# Patient Record
Sex: FEMALE | Race: WHITE | NOT HISPANIC OR LATINO | Employment: FULL TIME | URBAN - METROPOLITAN AREA
[De-identification: names, ages, dates, MRNs, and addresses within clinical notes are randomized per-mention and may not be internally consistent; named-entity substitution may affect disease eponyms.]

---

## 2023-01-22 ENCOUNTER — HOSPITAL ENCOUNTER (EMERGENCY)
Facility: HOSPITAL | Age: 33
Discharge: HOME | End: 2023-01-22
Attending: EMERGENCY MEDICINE | Admitting: EMERGENCY MEDICINE
Payer: COMMERCIAL

## 2023-01-22 ENCOUNTER — APPOINTMENT (OUTPATIENT)
Dept: CT IMAGING | Facility: HOSPITAL | Age: 33
End: 2023-01-22
Payer: COMMERCIAL

## 2023-01-22 VITALS
OXYGEN SATURATION: 97 % | SYSTOLIC BLOOD PRESSURE: 124 MMHG | HEART RATE: 65 BPM | TEMPERATURE: 99 F | RESPIRATION RATE: 16 BRPM | DIASTOLIC BLOOD PRESSURE: 82 MMHG

## 2023-01-22 DIAGNOSIS — G43.909 MIGRAINE WITHOUT STATUS MIGRAINOSUS, NOT INTRACTABLE, UNSPECIFIED MIGRAINE TYPE: Primary | ICD-10-CM

## 2023-01-22 DIAGNOSIS — R07.89 ATYPICAL CHEST PAIN: ICD-10-CM

## 2023-01-22 LAB
ALBUMIN SERPL-MCNC: 4 G/DL (ref 3.5–5.2)
ALBUMIN/GLOB SERPL: 1.5 G/DL
ALP SERPL-CCNC: 44 U/L (ref 39–117)
ALT SERPL W P-5'-P-CCNC: 15 U/L (ref 1–33)
ANION GAP SERPL CALCULATED.3IONS-SCNC: 8.4 MMOL/L (ref 5–15)
AST SERPL-CCNC: 13 U/L (ref 1–32)
BASOPHILS # BLD AUTO: 0.05 10*3/MM3 (ref 0–0.2)
BASOPHILS NFR BLD AUTO: 0.8 % (ref 0–1.5)
BILIRUB SERPL-MCNC: 0.3 MG/DL (ref 0–1.2)
BUN SERPL-MCNC: 11 MG/DL (ref 6–20)
BUN/CREAT SERPL: 13.9 (ref 7–25)
CALCIUM SPEC-SCNC: 8.7 MG/DL (ref 8.6–10.5)
CHLORIDE SERPL-SCNC: 108 MMOL/L (ref 98–107)
CO2 SERPL-SCNC: 22.6 MMOL/L (ref 22–29)
CREAT SERPL-MCNC: 0.79 MG/DL (ref 0.57–1)
D DIMER PPP FEU-MCNC: <0.27 MCGFEU/ML (ref 0–0.5)
DEPRECATED RDW RBC AUTO: 38.4 FL (ref 37–54)
EGFRCR SERPLBLD CKD-EPI 2021: 103.3 ML/MIN/1.73
EOSINOPHIL # BLD AUTO: 0.14 10*3/MM3 (ref 0–0.4)
EOSINOPHIL NFR BLD AUTO: 2.2 % (ref 0.3–6.2)
ERYTHROCYTE [DISTWIDTH] IN BLOOD BY AUTOMATED COUNT: 12.3 % (ref 12.3–15.4)
GLOBULIN UR ELPH-MCNC: 2.6 GM/DL
GLUCOSE SERPL-MCNC: 102 MG/DL (ref 65–99)
HCT VFR BLD AUTO: 44 % (ref 34–46.6)
HGB BLD-MCNC: 14.8 G/DL (ref 12–15.9)
HOLD SPECIMEN: NORMAL
HOLD SPECIMEN: NORMAL
IMM GRANULOCYTES # BLD AUTO: 0.01 10*3/MM3 (ref 0–0.05)
IMM GRANULOCYTES NFR BLD AUTO: 0.2 % (ref 0–0.5)
LYMPHOCYTES # BLD AUTO: 1.55 10*3/MM3 (ref 0.7–3.1)
LYMPHOCYTES NFR BLD AUTO: 24.7 % (ref 19.6–45.3)
MCH RBC QN AUTO: 28.7 PG (ref 26.6–33)
MCHC RBC AUTO-ENTMCNC: 33.6 G/DL (ref 31.5–35.7)
MCV RBC AUTO: 85.3 FL (ref 79–97)
MONOCYTES # BLD AUTO: 0.34 10*3/MM3 (ref 0.1–0.9)
MONOCYTES NFR BLD AUTO: 5.4 % (ref 5–12)
NEUTROPHILS NFR BLD AUTO: 4.19 10*3/MM3 (ref 1.7–7)
NEUTROPHILS NFR BLD AUTO: 66.7 % (ref 42.7–76)
NRBC BLD AUTO-RTO: 0 /100 WBC (ref 0–0.2)
PLATELET # BLD AUTO: 273 10*3/MM3 (ref 140–450)
PMV BLD AUTO: 11 FL (ref 6–12)
POTASSIUM SERPL-SCNC: 4.2 MMOL/L (ref 3.5–5.2)
PROT SERPL-MCNC: 6.6 G/DL (ref 6–8.5)
QT INTERVAL: 367 MS
RBC # BLD AUTO: 5.16 10*6/MM3 (ref 3.77–5.28)
SODIUM SERPL-SCNC: 139 MMOL/L (ref 136–145)
TROPONIN T SERPL-MCNC: <0.01 NG/ML (ref 0–0.03)
WBC NRBC COR # BLD: 6.28 10*3/MM3 (ref 3.4–10.8)
WHOLE BLOOD HOLD COAG: NORMAL
WHOLE BLOOD HOLD SPECIMEN: NORMAL

## 2023-01-22 PROCEDURE — 70450 CT HEAD/BRAIN W/O DYE: CPT

## 2023-01-22 PROCEDURE — 96375 TX/PRO/DX INJ NEW DRUG ADDON: CPT

## 2023-01-22 PROCEDURE — 93005 ELECTROCARDIOGRAM TRACING: CPT

## 2023-01-22 PROCEDURE — 85025 COMPLETE CBC W/AUTO DIFF WBC: CPT

## 2023-01-22 PROCEDURE — 84484 ASSAY OF TROPONIN QUANT: CPT

## 2023-01-22 PROCEDURE — 99283 EMERGENCY DEPT VISIT LOW MDM: CPT

## 2023-01-22 PROCEDURE — 85379 FIBRIN DEGRADATION QUANT: CPT | Performed by: NURSE PRACTITIONER

## 2023-01-22 PROCEDURE — 93010 ELECTROCARDIOGRAM REPORT: CPT | Performed by: INTERNAL MEDICINE

## 2023-01-22 PROCEDURE — 36415 COLL VENOUS BLD VENIPUNCTURE: CPT

## 2023-01-22 PROCEDURE — 25010000002 METHYLPREDNISOLONE PER 125 MG: Performed by: NURSE PRACTITIONER

## 2023-01-22 PROCEDURE — 25010000002 PROCHLORPERAZINE 10 MG/2ML SOLUTION: Performed by: NURSE PRACTITIONER

## 2023-01-22 PROCEDURE — 93005 ELECTROCARDIOGRAM TRACING: CPT | Performed by: EMERGENCY MEDICINE

## 2023-01-22 PROCEDURE — 80053 COMPREHEN METABOLIC PANEL: CPT

## 2023-01-22 PROCEDURE — 25010000002 DIPHENHYDRAMINE PER 50 MG: Performed by: NURSE PRACTITIONER

## 2023-01-22 PROCEDURE — 96374 THER/PROPH/DIAG INJ IV PUSH: CPT

## 2023-01-22 RX ORDER — PROCHLORPERAZINE EDISYLATE 5 MG/ML
10 INJECTION INTRAMUSCULAR; INTRAVENOUS ONCE
Status: COMPLETED | OUTPATIENT
Start: 2023-01-22 | End: 2023-01-22

## 2023-01-22 RX ORDER — OMEPRAZOLE 20 MG/1
20 CAPSULE, DELAYED RELEASE ORAL DAILY
COMMUNITY

## 2023-01-22 RX ORDER — DIPHENHYDRAMINE HYDROCHLORIDE 50 MG/ML
25 INJECTION INTRAMUSCULAR; INTRAVENOUS ONCE
Status: COMPLETED | OUTPATIENT
Start: 2023-01-22 | End: 2023-01-22

## 2023-01-22 RX ORDER — CETIRIZINE HYDROCHLORIDE 5 MG/1
5 TABLET ORAL DAILY
COMMUNITY

## 2023-01-22 RX ORDER — METHYLPREDNISOLONE SODIUM SUCCINATE 125 MG/2ML
125 INJECTION, POWDER, LYOPHILIZED, FOR SOLUTION INTRAMUSCULAR; INTRAVENOUS ONCE
Status: COMPLETED | OUTPATIENT
Start: 2023-01-22 | End: 2023-01-22

## 2023-01-22 RX ORDER — SODIUM CHLORIDE 0.9 % (FLUSH) 0.9 %
10 SYRINGE (ML) INJECTION AS NEEDED
Status: DISCONTINUED | OUTPATIENT
Start: 2023-01-22 | End: 2023-01-22 | Stop reason: HOSPADM

## 2023-01-22 RX ADMIN — SODIUM CHLORIDE 1000 ML: 9 INJECTION, SOLUTION INTRAVENOUS at 14:35

## 2023-01-22 RX ADMIN — PROCHLORPERAZINE EDISYLATE 10 MG: 5 INJECTION INTRAMUSCULAR; INTRAVENOUS at 14:35

## 2023-01-22 RX ADMIN — DIPHENHYDRAMINE HYDROCHLORIDE 25 MG: 50 INJECTION, SOLUTION INTRAMUSCULAR; INTRAVENOUS at 14:35

## 2023-01-22 RX ADMIN — METHYLPREDNISOLONE SODIUM SUCCINATE 125 MG: 125 INJECTION, POWDER, FOR SOLUTION INTRAMUSCULAR; INTRAVENOUS at 14:35

## 2023-01-22 NOTE — ED TRIAGE NOTES
.Pt masked on arrival, staff masked    Pt reports migraine ha for weeks, rt sided chest pain that started yesterday morning

## 2023-01-22 NOTE — ED PROVIDER NOTES
EMERGENCY DEPARTMENT ENCOUNTER    Room Number:  26/26  Date of encounter:  1/22/2023  PCP: Ariella Weber APRN  Patient Care Team:  Ariella Weber APRN as PCP - General (Family Medicine)   Independent Historians: Patient        PPE: Patient was placed in face mask in first look. Patient was wearing facemask when I entered the room and throughout our encounter. I wore full protective equipment throughout this patient encounter including a face mask, and gloves. Hand hygiene was performed before donning protective equipment and after removal when leaving the room.      HPI:  Chief Complaint: Headache and right-sided chest pain  A complete HPI/ROS/PMH/PSH/SH/FH are unobtainable due to: Nothing    Chronic or social conditions impacting patient care (social determinants of health): None    Context: Mariana Olmstead is a 32 y.o. female with a history of asthma who arrives to the ED via private vehicle.  Patient presents with c/o mild to moderate, constant, achy right-sided frontal headache for the past 2 weeks.  Patient states that being in a quiet dark room improves her symptoms, light and sound worsen the headache.  She saw her PMD Friday and was prescribed migraine medication but states that has not helped.  She is also had some nausea and vomiting with this headache.  Patient also complains of right-sided chest pain that started yesterday and has been constant and achy in nature.  States it is a sharp pain when she takes a deep breath.  She states she noticed this pain when she first took the new medication.  Patient denies fever, chills, neck stiffness, dizziness, lightheadedness, shortness of breath, leg swelling or pain.       Review of prior external notes (non-ED): Medical records reviewed in epic, patient saw her PMD 1/20/2023 for headache.  She is scheduled to get an MRI of the brain without contrast 2/20/2023.  She was prescribed Maxalt.    Review of prior external test results outside of this  encounter: CT of the head was performed 10/12/2021 which was unremarkable.    PAST MEDICAL HISTORY  Active Ambulatory Problems     Diagnosis Date Noted   • No Active Ambulatory Problems     Resolved Ambulatory Problems     Diagnosis Date Noted   • No Resolved Ambulatory Problems     Past Medical History:   Diagnosis Date   • Asthma        The patient has started, but not completed, their COVID-19 vaccination series.    PAST SURGICAL HISTORY  Past Surgical History:   Procedure Laterality Date   • CHOLECYSTECTOMY     • GASTRIC SLEEVE LAPAROSCOPIC           FAMILY HISTORY  Family History   Problem Relation Age of Onset   • No Known Problems Mother    • Hyperlipidemia Father    • Hypertension Father          SOCIAL HISTORY  Social History     Socioeconomic History   • Marital status:    Tobacco Use   • Smoking status: Never   Substance and Sexual Activity   • Alcohol use: No         ALLERGIES  Patient has no known allergies.        REVIEW OF SYSTEMS  Review of Systems     All systems reviewed and negative except for those discussed in HPI.       PHYSICAL EXAM    I have reviewed the triage vital signs and nursing notes.    ED Triage Vitals   Temp Heart Rate Resp BP SpO2   01/22/23 1321 01/22/23 1321 01/22/23 1321 01/22/23 1356 01/22/23 1321   99 °F (37.2 °C) 111 16 126/81 99 %         Physical Exam  GENERAL: Well appearing, nontoxic appearing, not distressed  HENT: normocephalic, atraumatic  EYES: no scleral icterus, PERRL  CV: regular rhythm, regular rate, no murmur  RESPIRATORY: normal effort, CTAB  ABDOMEN: soft, nontender  MUSCULOSKELETAL: no deformity  No cervical spine tenderness to palpation  NEURO: alert, moves all extremities, follows commands, mental status normal/baseline  Recent and remote memory functions are normal  Patient is attentive with normal concentration  Language is fluent  Speech is clear  Speech is non-dysarthric  Symmetric smile with no facial droop  Eyes close shut strongly  bilaterally  Symmetric eyebrow raise bilaterally  EOMI, PERRL  CN II-XII grossly normal otherwise  5/5 strength to bilateral upper and lower extremities  No pronator drift  Intact FNF   SKIN: warm, dry, no rash   Psych: Appropriate mood and affect  Nursing notes and vital signs reviewed          LAB RESULTS  Recent Results (from the past 24 hour(s))   ECG 12 Lead Chest Pain    Collection Time: 01/22/23  1:34 PM   Result Value Ref Range    QT Interval 367 ms   Green Top (Gel)    Collection Time: 01/22/23  1:59 PM   Result Value Ref Range    Extra Tube Hold for add-ons.    Lavender Top    Collection Time: 01/22/23  1:59 PM   Result Value Ref Range    Extra Tube hold for add-on    Gold Top - SST    Collection Time: 01/22/23  1:59 PM   Result Value Ref Range    Extra Tube Hold for add-ons.    CBC Auto Differential    Collection Time: 01/22/23  1:59 PM    Specimen: Blood   Result Value Ref Range    WBC 6.28 3.40 - 10.80 10*3/mm3    RBC 5.16 3.77 - 5.28 10*6/mm3    Hemoglobin 14.8 12.0 - 15.9 g/dL    Hematocrit 44.0 34.0 - 46.6 %    MCV 85.3 79.0 - 97.0 fL    MCH 28.7 26.6 - 33.0 pg    MCHC 33.6 31.5 - 35.7 g/dL    RDW 12.3 12.3 - 15.4 %    RDW-SD 38.4 37.0 - 54.0 fl    MPV 11.0 6.0 - 12.0 fL    Platelets 273 140 - 450 10*3/mm3    Neutrophil % 66.7 42.7 - 76.0 %    Lymphocyte % 24.7 19.6 - 45.3 %    Monocyte % 5.4 5.0 - 12.0 %    Eosinophil % 2.2 0.3 - 6.2 %    Basophil % 0.8 0.0 - 1.5 %    Immature Grans % 0.2 0.0 - 0.5 %    Neutrophils, Absolute 4.19 1.70 - 7.00 10*3/mm3    Lymphocytes, Absolute 1.55 0.70 - 3.10 10*3/mm3    Monocytes, Absolute 0.34 0.10 - 0.90 10*3/mm3    Eosinophils, Absolute 0.14 0.00 - 0.40 10*3/mm3    Basophils, Absolute 0.05 0.00 - 0.20 10*3/mm3    Immature Grans, Absolute 0.01 0.00 - 0.05 10*3/mm3    nRBC 0.0 0.0 - 0.2 /100 WBC   Light Blue Top    Collection Time: 01/22/23  2:04 PM   Result Value Ref Range    Extra Tube Hold for add-ons.    D-dimer, Quantitative    Collection Time: 01/22/23  2:04  PM    Specimen: Blood   Result Value Ref Range    D-Dimer, Quantitative <0.27 0.00 - 0.50 MCGFEU/mL   Comprehensive Metabolic Panel    Collection Time: 01/22/23  4:18 PM    Specimen: Blood   Result Value Ref Range    Glucose 102 (H) 65 - 99 mg/dL    BUN 11 6 - 20 mg/dL    Creatinine 0.79 0.57 - 1.00 mg/dL    Sodium 139 136 - 145 mmol/L    Potassium 4.2 3.5 - 5.2 mmol/L    Chloride 108 (H) 98 - 107 mmol/L    CO2 22.6 22.0 - 29.0 mmol/L    Calcium 8.7 8.6 - 10.5 mg/dL    Total Protein 6.6 6.0 - 8.5 g/dL    Albumin 4.0 3.5 - 5.2 g/dL    ALT (SGPT) 15 1 - 33 U/L    AST (SGOT) 13 1 - 32 U/L    Alkaline Phosphatase 44 39 - 117 U/L    Total Bilirubin 0.3 0.0 - 1.2 mg/dL    Globulin 2.6 gm/dL    A/G Ratio 1.5 g/dL    BUN/Creatinine Ratio 13.9 7.0 - 25.0    Anion Gap 8.4 5.0 - 15.0 mmol/L    eGFR 103.3 >60.0 mL/min/1.73   Troponin    Collection Time: 01/22/23  4:18 PM    Specimen: Blood   Result Value Ref Range    Troponin T <0.010 0.000 - 0.030 ng/mL       Ordered the above labs and independently reviewed the results.        RADIOLOGY  CT Head Without Contrast    Result Date: 1/22/2023  CT OF THE BRAIN WITHOUT CONTRAST 01/22/2023  HISTORY: Headache.  TECHNIQUE: Axial images were obtained through the brain without intravenous contrast.  FINDINGS: The brain parenchyma and ventricular system appear within normal limits. No mass lesions, midline shift, intracranial hemorrhage or evidence of infarction is demonstrated.  No bony abnormalities are seen.      1. No acute process.  Radiation dose reduction techniques were utilized, including automated exposure control and exposure modulation based on body size.  This report was finalized on 1/22/2023 5:02 PM by Dr. Jani Mera M.D.        I ordered the above noted radiological studies. Reviewed by me and discussed with radiologist.  See dictation for official radiology interpretation.      PROCEDURES    HEART SCORE    History Slightly or non-suspicious (0)  ECG Normal (0)  Age  < or = 45 (0)  Risk factors No risk factors (0)  Troponin < or = Normal limit (0)    This patient's HEART score is 0    HEART Score Key:  Scores 0-3: 0.9-1.7% risk of adverse cardiac event. In the HEART Score study, these patients were discharged (0.99% in the retrospective study, 1.7% in the prospective study)  Scores 4-6: 12-16.6% risk of adverse cardiac event. In the HEART Score study, these patients were admitted to the hospital. (11.6% retrospective, 16.6% prospective)  Scores ?7: 50-65% risk of adverse cardiac event. In the HEART Score study, these patients were candidates for early invasive measures. (65.2% retrospective, 50.1% prospective)      Procedures    DIFFERENTIAL DIAGNOSIS:  Differential Diagnosis for Headache include but are not limited to the following:  -Migraine  - Cluster Headache  - Ischemic Stroke  - Intracranial Hemorrhage  - Cerebral Aneurysm  - Temporal Arteritis  - Meningitis(Bacterial or Viral)  - Sinusitis, Dental Infection  -Viral Syndrome,  -Hypertension  -Concussion      PROGRESS, DATA ANALYSIS, CONSULTS, AND MEDICAL DECISION MAKING    All labs have been independently reviewed by me.  All radiology studies have been reviewed by me and discussed with radiologist dictating the report.   EKG's independently viewed and interpreted by me.  Discussion below represents my analysis of pertinent findings related to patient's condition, differential diagnosis, treatment plan and final disposition.        ED Course as of 01/22/23 1928   Sun Jan 22, 2023   1449 Patient is a 32-year-old who presents with a right-sided headache for the past 2 weeks and right-sided chest pain that started after taking her first dose of Maxalt yesterday.  Plan is to do CT of the head to rule out intercranial abnormality, will check basic labs including troponin and D-dimer to rule out cardiac nature of her chest pain.  She will receive migraine cocktail along with IV fluids. [MS]   1450 D-Dimer, Quant: <0.27 [MS]    1517 Reviewed pt's history and workup with Dr. Parr.  After a bedside evaluation, he agrees with the plan of care.  Care transferred to him at this time.   [MS]   1613 CT of the brain was independently viewed by me and discussed with/interpreted by Dr. Mera (radiology)-there are no acute processes identified.  For official interpretation, see dictated report. [WC]      ED Course User Index  [MS] Radha Marino, APRN  [WC] Obed Parr MD         DISPOSITION  ED Disposition     ED Disposition   Discharge    Condition   Stable    Comment   --               AS OF 19:28 EST VITALS:    BP - 124/82  HR - 65  TEMP - 99 °F (37.2 °C)  O2 SATS - 97%      MEDICATIONS GIVEN IN ER    Medications   sodium chloride 0.9 % bolus 1,000 mL (0 mL Intravenous Stopped 1/22/23 1712)   diphenhydrAMINE (BENADRYL) injection 25 mg (25 mg Intravenous Given 1/22/23 1435)   methylPREDNISolone sodium succinate (SOLU-Medrol) injection 125 mg (125 mg Intravenous Given 1/22/23 1435)   prochlorperazine (COMPAZINE) injection 10 mg (10 mg Intravenous Given 1/22/23 1435)                Note Disclaimer: At [facility], we believe that sharing information builds trust and better relationships. You are receiving this note because you recently visited [facility]. It is possible you will see health information before a provider has talked with you about it. This kind of information can be easy to misunderstand. To help you fully understand what it means for your health, we urge you to discuss this note with your provider.       Radha Marino, GIANNA  01/22/23 1928

## 2023-01-22 NOTE — ED PROVIDER NOTES
MD ATTESTATION NOTE    The MATTHEW and I have discussed this patient's history, physical exam, and treatment plan.  I have reviewed the documentation and personally had a face to face interaction with the patient. I affirm the documentation and agree with the treatment and plan.  The attached note describes my personal findings.      I provided a substantive portion of the care of the patient.  I personally performed the physical exam in its entirety, and below are my findings.  For this patient encounter, the patient wore surgical mask, I wore full protective PPE including N95 and eye protection.      Brief HPI: Patient presents to the emergency department with a moderate, achy headache for the past few weeks  She was started on rizatriptan by her primary care provider but has not had any relief.  She also was having bouts of chest pain yesterday.  At the time of my evaluation, she states she is feeling much better.  Headache is almost completely resolved    PHYSICAL EXAM  ED Triage Vitals   Temp Heart Rate Resp BP SpO2   01/22/23 1321 01/22/23 1321 01/22/23 1321 01/22/23 1356 01/22/23 1321   99 °F (37.2 °C) 111 16 126/81 99 %      Temp src Heart Rate Source Patient Position BP Location FiO2 (%)   -- -- -- -- --                GENERAL: Awake, alert, no acute distress  HENT: NCAT  EYES: no scleral icterus  CV: regular rhythm, normal rate  RESPIRATORY: normal effort, clear to auscultation bilaterally  ABDOMEN: soft, nondistended nontender  MUSCULOSKELETAL: no deformites  NEURO: alert, moves all extremities, follows commands  PSYCH:  calm, cooperative  SKIN: warm, dry    Vital signs and nursing notes reviewed.        Plan:   ED Course as of 01/30/23 1738   Sun Jan 22, 2023   0957 Patient is a 32-year-old who presents with a right-sided headache for the past 2 weeks and right-sided chest pain that started after taking her first dose of Maxalt yesterday.  Plan is to do CT of the head to rule out intercranial abnormality,  will check basic labs including troponin and D-dimer to rule out cardiac nature of her chest pain.  She will receive migraine cocktail along with IV fluids. [MS]   1450 D-Dimer, Quant: <0.27 [MS]   1517 Reviewed pt's history and workup with Dr. Parr.  After a bedside evaluation, he agrees with the plan of care.  Care transferred to him at this time.   [MS]   1613 CT of the brain was independently viewed by me and discussed with/interpreted by Dr. Mera (radiology)-there are no acute processes identified.  For official interpretation, see dictated report. [WC]      ED Course User Index  [MS] Radha Marino, APRN  [WC] Obed Parr MD       She is safe for discharge with outpatient follow-up.       DX:  Migraine  Atypical chest pain    Obed Parr MD  01/22/23 1702       Obed Parr MD  01/30/23 9206

## 2024-04-01 ENCOUNTER — APPOINTMENT (OUTPATIENT)
Dept: CT IMAGING | Facility: HOSPITAL | Age: 34
End: 2024-04-01
Payer: COMMERCIAL

## 2024-04-01 ENCOUNTER — HOSPITAL ENCOUNTER (EMERGENCY)
Facility: HOSPITAL | Age: 34
Discharge: HOME | End: 2024-04-01
Attending: EMERGENCY MEDICINE | Admitting: EMERGENCY MEDICINE
Payer: COMMERCIAL

## 2024-04-01 VITALS
BODY MASS INDEX: 35.31 KG/M2 | TEMPERATURE: 98.9 F | WEIGHT: 225 LBS | OXYGEN SATURATION: 99 % | HEART RATE: 78 BPM | DIASTOLIC BLOOD PRESSURE: 93 MMHG | SYSTOLIC BLOOD PRESSURE: 134 MMHG | HEIGHT: 67 IN | RESPIRATION RATE: 16 BRPM

## 2024-04-01 DIAGNOSIS — R10.30 LOWER ABDOMINAL PAIN: ICD-10-CM

## 2024-04-01 DIAGNOSIS — K50.00 TERMINAL ILEITIS WITHOUT COMPLICATION: Primary | ICD-10-CM

## 2024-04-01 LAB
ALBUMIN SERPL-MCNC: 4.1 G/DL (ref 3.5–5.2)
ALBUMIN/GLOB SERPL: 1.9 G/DL
ALP SERPL-CCNC: 46 U/L (ref 39–117)
ALT SERPL W P-5'-P-CCNC: 14 U/L (ref 1–33)
ANION GAP SERPL CALCULATED.3IONS-SCNC: 9.5 MMOL/L (ref 5–15)
AST SERPL-CCNC: 13 U/L (ref 1–32)
B-HCG UR QL: NEGATIVE
BASOPHILS # BLD AUTO: 0.05 10*3/MM3 (ref 0–0.2)
BASOPHILS NFR BLD AUTO: 0.7 % (ref 0–1.5)
BILIRUB SERPL-MCNC: 0.2 MG/DL (ref 0–1.2)
BILIRUB UR QL STRIP: NEGATIVE
BUN SERPL-MCNC: 11 MG/DL (ref 6–20)
BUN/CREAT SERPL: 12.1 (ref 7–25)
CALCIUM SPEC-SCNC: 8.5 MG/DL (ref 8.6–10.5)
CHLORIDE SERPL-SCNC: 107 MMOL/L (ref 98–107)
CLARITY UR: ABNORMAL
CO2 SERPL-SCNC: 23.5 MMOL/L (ref 22–29)
COLOR UR: YELLOW
CREAT SERPL-MCNC: 0.91 MG/DL (ref 0.57–1)
DEPRECATED RDW RBC AUTO: 42.5 FL (ref 37–54)
EGFRCR SERPLBLD CKD-EPI 2021: 86.1 ML/MIN/1.73
EOSINOPHIL # BLD AUTO: 0.22 10*3/MM3 (ref 0–0.4)
EOSINOPHIL NFR BLD AUTO: 3 % (ref 0.3–6.2)
ERYTHROCYTE [DISTWIDTH] IN BLOOD BY AUTOMATED COUNT: 13 % (ref 12.3–15.4)
GLOBULIN UR ELPH-MCNC: 2.2 GM/DL
GLUCOSE SERPL-MCNC: 95 MG/DL (ref 65–99)
GLUCOSE UR STRIP-MCNC: NEGATIVE MG/DL
HCT VFR BLD AUTO: 39.8 % (ref 34–46.6)
HGB BLD-MCNC: 13.4 G/DL (ref 12–15.9)
HGB UR QL STRIP.AUTO: NEGATIVE
IMM GRANULOCYTES # BLD AUTO: 0.03 10*3/MM3 (ref 0–0.05)
IMM GRANULOCYTES NFR BLD AUTO: 0.4 % (ref 0–0.5)
KETONES UR QL STRIP: NEGATIVE
LEUKOCYTE ESTERASE UR QL STRIP.AUTO: NEGATIVE
LYMPHOCYTES # BLD AUTO: 2.17 10*3/MM3 (ref 0.7–3.1)
LYMPHOCYTES NFR BLD AUTO: 29.5 % (ref 19.6–45.3)
MCH RBC QN AUTO: 30.2 PG (ref 26.6–33)
MCHC RBC AUTO-ENTMCNC: 33.7 G/DL (ref 31.5–35.7)
MCV RBC AUTO: 89.8 FL (ref 79–97)
MONOCYTES # BLD AUTO: 0.6 10*3/MM3 (ref 0.1–0.9)
MONOCYTES NFR BLD AUTO: 8.2 % (ref 5–12)
NEUTROPHILS NFR BLD AUTO: 4.29 10*3/MM3 (ref 1.7–7)
NEUTROPHILS NFR BLD AUTO: 58.2 % (ref 42.7–76)
NITRITE UR QL STRIP: NEGATIVE
NRBC BLD AUTO-RTO: 0 /100 WBC (ref 0–0.2)
PH UR STRIP.AUTO: 5.5 [PH] (ref 4.5–8)
PLATELET # BLD AUTO: 223 10*3/MM3 (ref 140–450)
PMV BLD AUTO: 11.5 FL (ref 6–12)
POTASSIUM SERPL-SCNC: 3.5 MMOL/L (ref 3.5–5.2)
PROT SERPL-MCNC: 6.3 G/DL (ref 6–8.5)
PROT UR QL STRIP: NEGATIVE
RBC # BLD AUTO: 4.43 10*6/MM3 (ref 3.77–5.28)
SODIUM SERPL-SCNC: 140 MMOL/L (ref 136–145)
SP GR UR STRIP: 1.03 (ref 1–1.03)
UROBILINOGEN UR QL STRIP: ABNORMAL
WBC NRBC COR # BLD AUTO: 7.36 10*3/MM3 (ref 3.4–10.8)

## 2024-04-01 PROCEDURE — 85025 COMPLETE CBC W/AUTO DIFF WBC: CPT | Performed by: EMERGENCY MEDICINE

## 2024-04-01 PROCEDURE — 25510000001 IOPAMIDOL PER 1 ML: Performed by: EMERGENCY MEDICINE

## 2024-04-01 PROCEDURE — 99285 EMERGENCY DEPT VISIT HI MDM: CPT

## 2024-04-01 PROCEDURE — 81025 URINE PREGNANCY TEST: CPT | Performed by: EMERGENCY MEDICINE

## 2024-04-01 PROCEDURE — 80053 COMPREHEN METABOLIC PANEL: CPT | Performed by: EMERGENCY MEDICINE

## 2024-04-01 PROCEDURE — 74177 CT ABD & PELVIS W/CONTRAST: CPT

## 2024-04-01 PROCEDURE — 81003 URINALYSIS AUTO W/O SCOPE: CPT | Performed by: EMERGENCY MEDICINE

## 2024-04-01 RX ORDER — SODIUM CHLORIDE 0.9 % (FLUSH) 0.9 %
10 SYRINGE (ML) INJECTION AS NEEDED
Status: DISCONTINUED | OUTPATIENT
Start: 2024-04-01 | End: 2024-04-01 | Stop reason: HOSPADM

## 2024-04-01 RX ORDER — ONDANSETRON 4 MG/1
4 TABLET, ORALLY DISINTEGRATING ORAL EVERY 8 HOURS PRN
Qty: 20 TABLET | Refills: 0 | Status: SHIPPED | OUTPATIENT
Start: 2024-04-01

## 2024-04-01 RX ADMIN — IOPAMIDOL 100 ML: 755 INJECTION, SOLUTION INTRAVENOUS at 02:55

## 2024-04-01 NOTE — ED PROVIDER NOTES
Subjective   History of Present Illness  Patient presents complaining of abdominal pain in her bilateral lower abdomen.  Patient says it started gradually yesterday and it is continued.  Patient says that has gone up and down in intensity and it always stays there.  Patient felt like it could be due to some bloating so she took a laxative and had a normal bowel movement.  Patient reports this did not change the discomfort of her abdomen.  Patient denies any vaginal bleeding, vaginal discharge, or pain with urination.  Patient does report that she has some suprapubic and lower periumbilical discomfort when she flexes her abdominal muscles to urinate.  Patient says at that point shoots to either side of her abdomen.      Review of Systems   All other systems reviewed and are negative.      Past Medical History:   Diagnosis Date    Asthma     GERD (gastroesophageal reflux disease)        No Known Allergies    Past Surgical History:   Procedure Laterality Date    CHOLECYSTECTOMY      GASTRIC SLEEVE LAPAROSCOPIC         Family History   Problem Relation Age of Onset    No Known Problems Mother     Hyperlipidemia Father     Hypertension Father        Social History     Socioeconomic History    Marital status:    Tobacco Use    Smoking status: Never   Substance and Sexual Activity    Alcohol use: No    Drug use: Never    Sexual activity: Defer           Objective   Physical Exam  Vitals and nursing note reviewed.   Constitutional:       Appearance: She is well-developed.      Comments: Patient lying in bed in no obvious distress.  Patient is talkative and friendly, cooperative with exam.   HENT:      Head: Normocephalic.   Eyes:      Conjunctiva/sclera: Conjunctivae normal.   Cardiovascular:      Rate and Rhythm: Normal rate and regular rhythm.   Pulmonary:      Effort: Pulmonary effort is normal.      Breath sounds: Normal breath sounds.   Abdominal:      General: Abdomen is protuberant. There is no distension.       Palpations: Abdomen is soft.      Tenderness: There is no right CVA tenderness, left CVA tenderness, guarding or rebound. Negative signs include Scherer's sign, Rovsing's sign and McBurney's sign.      Hernia: No hernia is present.          Comments: Active bowel sounds in all 4 quadrants.  Mild discomfort in lower periumbilical abdomen to deep palpation   Skin:     General: Skin is warm and dry.      Capillary Refill: Capillary refill takes 2 to 3 seconds.   Neurological:      Mental Status: She is alert and oriented to person, place, and time.   Psychiatric:         Mood and Affect: Mood normal.         Behavior: Behavior normal.         Procedures           ED Course                                             Medical Decision Making  Ddx -UTI, colitis, enteritis, appendicitis, gastroenteritis, kidney stone    Labs Reviewed  URINALYSIS W/ MICROSCOPIC IF INDICATED (NO CULTURE) - Abnormal; Notable for the following components:     Appearance, UA                  (*)                  Specific Gravity, UA          1.031 (*)            All other components within normal limits         Narrative: Urine microscopic not indicated.  COMPREHENSIVE METABOLIC PANEL - Abnormal; Notable for the following components:     Calcium                       8.5 (*)             All other components within normal limits         Narrative: GFR Normal >60                  Chronic Kidney Disease <60                  Kidney Failure <15                    PREGNANCY, URINE - Normal  CBC WITH AUTO DIFFERENTIAL - Normal  CBC AND DIFFERENTIAL    CT Abdomen Pelvis With Contrast    Result Date: 4/1/2024  Impression: 1.Questional mild wall thickening present within the terminal ileum which appears mildly distended and fluid-filled. Findings may be related to a nonspecific enteritis. Given the location, inflammatory bowel disease such as Crohn's disease should also be  considered.. Otherwise, no acute intra-abdominal or intrapelvic process.  2.Ancillary findings as described above. Electronically Signed: Audra Basilio MD  4/1/2024 3:02 AM EDT  Workstation ID: [id number]        Amount and/or Complexity of Data Reviewed  Labs: ordered.  Radiology: ordered.    Risk  Prescription drug management.        Final diagnoses:   Terminal ileitis without complication   Lower abdominal pain       ED Disposition  ED Disposition       ED Disposition   Discharge    Condition   Stable    Comment   --               Pope, Peggy, APRN  [address]  [address]  [facility] [id number] [phone]    In 3 days      Suman Harley MD  [address]  [facility] [id number] [phone]    In 1 week           Medication List        New Prescriptions      ondansetron ODT 4 MG disintegrating tablet  Commonly known as: ZOFRAN-ODT  Place 1 tablet on the tongue Every 8 (Eight) Hours As Needed for Nausea or Vomiting.               Where to Get Your Medications        These medications were sent to [facility] [address] [place] [address]), [place] - [address] [facility] - [phone]  - [phone] [address]) [place] [address]      Phone: [phone]   ondansetron ODT 4 MG disintegrating tablet            Lucas Rayo MD  04/01/24 1749

## 2024-04-01 NOTE — ED NOTES
IV inserted by previous nurse would no longer flush and no blood return was noted while in radiology, Intervenous access was established in the left AC without incident. The IV was positive for blood return and was able to flush without difficulty or complaint from the patient.

## 2024-04-01 NOTE — ED NOTES
Patient attempted to urinate and provide sample prior to being placed in a room but was unsuccessful.

## 2024-04-12 ENCOUNTER — OFFICE VISIT (OUTPATIENT)
Dept: GASTROENTEROLOGY | Facility: CLINIC | Age: 34
End: 2024-04-12
Payer: COMMERCIAL

## 2024-04-12 VITALS
SYSTOLIC BLOOD PRESSURE: 128 MMHG | WEIGHT: 235.2 LBS | DIASTOLIC BLOOD PRESSURE: 88 MMHG | BODY MASS INDEX: 36.91 KG/M2 | HEIGHT: 67 IN

## 2024-04-12 DIAGNOSIS — R19.7 DIARRHEA, UNSPECIFIED TYPE: Primary | ICD-10-CM

## 2024-04-12 DIAGNOSIS — R10.84 GENERALIZED ABDOMINAL PAIN: ICD-10-CM

## 2024-04-12 RX ORDER — CHOLECALCIFEROL (VITAMIN D3) 10(400)/ML
1 DROPS ORAL DAILY
COMMUNITY

## 2024-04-12 RX ORDER — BUPROPION HYDROCHLORIDE 150 MG/1
150 TABLET, EXTENDED RELEASE ORAL DAILY
COMMUNITY

## 2024-04-12 RX ORDER — LEVONORGESTREL 52 MG/1
1 INTRAUTERINE DEVICE INTRAUTERINE ONCE
COMMUNITY

## 2024-04-12 NOTE — PROGRESS NOTES
"    PATIENT INFORMATION  Mariana Olmstead       - 1990    CHIEF COMPLAINT  Chief Complaint   Patient presents with   • Abdominal Pain   • Diarrhea       HISTORY OF PRESENT ILLNESS  Here today for ER follow-up    2024 ER for Danny LQ abdominal pain. Started gradually, fluctuating intensity.     CT Ab pelvis, mild call thickening present in the TI, mildly distended and fluid filled. CBC/CMP unremarkable.    Still having abdominal pain that is 4 days out of the week, was bad this morning, not sure what is causing it. Trying to diet since  and not losing weight, calorie deficit. Pain is lower abdominal pain. TVUS and external with OB was normal, mirena in place.     Bowels move every other day, today is loose. Normally solid BM.  Sometimes hurts when needs to have BM, no relief with. Had hard stools last week and started metamucil and upped fiber. No bleeding. Last hard BM Saturday.    HB since having son 6 yrs, difficult to managed, well controlled on 20 mg omeprazole every day, avoids omeprazole. Ibuprofen on .    Asthma, no other issues.    No family hx of autoimmune issues, uncle with gastric cancer, mom with polyps.  Gastric sleeve 10 yrs  7 yrs ago cholecystectomy    Abdominal Pain  Associated symptoms include diarrhea and headaches. Pertinent negatives include no constipation, nausea or vomiting.   Diarrhea   Associated symptoms include abdominal pain and headaches. Pertinent negatives include no vomiting.       REVIEWED PERTINENT RESULTS/ LABS  No results found for: \"CASEREPORT\", \"FINALDX\"  Lab Results   Component Value Date    HGB 13.4 2024    MCV 89.8 2024     2024    ALT 14 2024    AST 13 2024    HGBA1C 5.0 2023    FERRITIN 27.2 2023    IRON 95 2023    TIBC 303 2023      CT Abdomen Pelvis With Contrast    Result Date: 2024  Narrative: CT ABDOMEN PELVIS W CONTRAST Date of Exam: 2024 2:41 AM EDT Indication: B lower abd " pain that is getting worse. Comparison: None available. Technique: Axial CT images were obtained of the abdomen and pelvis following the uneventful intravenous administration of iodinated contrast. Sagittal and coronal reconstructions were performed.  Automated exposure control and iterative reconstruction methods were used. Findings: Lung Bases:   The visualized lung bases and lower mediastinal structures are unremarkable. Liver: Liver is normal in size and CT density. No focal lesions. Biliary/Gallbladder:  The gallbladder has been resected.. The biliary tree is nondilated. Spleen: Spleen is normal in size and CT density. Pancreas:  Pancreas is normal. There is no evidence of pancreatic mass or peripancreatic fluid. Kidneys:  Kidneys are normal in size. There are no stones or hydronephrosis. Adrenals:  Adrenal glands are unremarkable. Retroperitoneal/Lymph Nodes/Vasculature:  No retroperitoneal adenopathy is identified. Gastrointestinal/Mesentery:  Postsurgical changes are seen related to previous sleeve gastrectomy. No significant stool burden identified. The bowel loops are non-dilated. Questional mild wall thickening is present within the terminal ileum. This appears mildly distended and fluid-filled.. The appendix appears within normal limits. No evidence of obstruction. No free air. No mesenteric fluid collections identified. Bladder:  The bladder is normal. Genital:   Unremarkable. IUD within the uterine cavity.       Bony Structures:   Visualized bony structures are consistent with the patient's age.     Impression: Impression: 1.Questional mild wall thickening present within the terminal ileum which appears mildly distended and fluid-filled. Findings may be related to a nonspecific enteritis. Given the location, inflammatory bowel disease such as Crohn's disease should also be  considered.. Otherwise, no acute intra-abdominal or intrapelvic process. 2.Ancillary findings as described above. Electronically  Signed: Audra Basilio MD  4/1/2024 3:02 AM EDT  Workstation ID: [id number]     REVIEW OF SYSTEMS  Review of Systems   Constitutional:  Positive for fatigue.   HENT: Negative.     Eyes: Negative.    Respiratory: Negative.     Cardiovascular: Negative.    Gastrointestinal:  Positive for abdominal pain and diarrhea. Negative for constipation, nausea and vomiting.   Endocrine: Negative.    Genitourinary:  Positive for difficulty urinating.   Musculoskeletal:  Positive for back pain.   Skin: Negative.    Allergic/Immunologic: Negative.    Neurological:  Positive for headaches.   Hematological:  Bruises/bleeds easily.   Psychiatric/Behavioral: Negative.           ACTIVE PROBLEMS  There are no problems to display for this patient.        PAST MEDICAL HISTORY  Past Medical History:   Diagnosis Date   • Asthma    • GERD (gastroesophageal reflux disease)          SURGICAL HISTORY  Past Surgical History:   Procedure Laterality Date   • BARIATRIC SURGERY  2012    Sleeve   • CHOLECYSTECTOMY     • GASTRIC SLEEVE LAPAROSCOPIC           FAMILY HISTORY  Family History   Problem Relation Age of Onset   • No Known Problems Mother    • Hyperlipidemia Father    • Hypertension Father          SOCIAL HISTORY  Social History     Occupational History   • Not on file   Tobacco Use   • Smoking status: Never   • Smokeless tobacco: Not on file   Substance and Sexual Activity   • Alcohol use: No   • Drug use: Never   • Sexual activity: Defer         CURRENT MEDICATIONS    Current Outpatient Medications:   •  albuterol (PROVENTIL HFA;VENTOLIN HFA) 108 (90 Base) MCG/ACT inhaler, Inhale 2 puffs Every 4 (Four) Hours As Needed for Wheezing., Disp: , Rfl:   •  buPROPion SR (WELLBUTRIN SR) 150 MG 12 hr tablet, Take 1 tablet by mouth Daily., Disp: , Rfl:   •  cetirizine (zyrTEC) 5 MG tablet, Take 1 tablet by mouth Daily., Disp: , Rfl:   •  Digestive Enzyme capsule, Take 1 capsule by mouth Daily., Disp: , Rfl:   •  fluticasone-salmeterol (ADVAIR) 100-50  "MCG/DOSE DISKUS, Inhale 2 (Two) Times a Day., Disp: , Rfl:   •  Levonorgestrel (Mirena, 52 MG,) 20 MCG/DAY intrauterine device IUD, 1 each by Intrauterine route Once., Disp: , Rfl:   •  omeprazole (priLOSEC) 20 MG capsule, Take 1 capsule by mouth Daily., Disp: , Rfl:   •  ondansetron ODT (ZOFRAN-ODT) 4 MG disintegrating tablet, Place 1 tablet on the tongue Every 8 (Eight) Hours As Needed for Nausea or Vomiting. (Patient not taking: Reported on 4/12/2024), Disp: 20 tablet, Rfl: 0    ALLERGIES  Patient has no known allergies.    VITALS  Vitals:    04/12/24 1114   BP: 128/88   BP Location: Left arm   Patient Position: Sitting   Cuff Size: Large Adult   Weight: 107 kg (235 lb 3.2 oz)   Height: 170.2 cm (67.01\")       PHYSICAL EXAM  Debilities/Disabilities Identified: None  Emotional Behavior: Appropriate  Wt Readings from Last 3 Encounters:   04/12/24 107 kg (235 lb 3.2 oz)   04/01/24 102 kg (225 lb)   03/19/18 84.4 kg (186 lb)     Ht Readings from Last 1 Encounters:   04/12/24 170.2 cm (67.01\")     Body mass index is 36.83 kg/m².  Physical Exam  Constitutional:       General: She is not in acute distress.     Appearance: Normal appearance. She is not ill-appearing.   HENT:      Head: Normocephalic and atraumatic.      Mouth/Throat:      Mouth: Mucous membranes are moist.      Pharynx: No posterior oropharyngeal erythema.   Eyes:      General: No scleral icterus.  Cardiovascular:      Rate and Rhythm: Normal rate and regular rhythm.      Heart sounds: Normal heart sounds.   Pulmonary:      Effort: Pulmonary effort is normal.      Breath sounds: Normal breath sounds.   Abdominal:      General: Abdomen is flat. Bowel sounds are normal. There is no distension.      Palpations: Abdomen is soft. There is no mass.      Tenderness: There is no abdominal tenderness. There is no guarding or rebound. Negative signs include Scherer's sign.      Hernia: No hernia is present.   Musculoskeletal:      Cervical back: Neck supple. "   Skin:     General: Skin is warm.      Capillary Refill: Capillary refill takes less than 2 seconds.   Neurological:      General: No focal deficit present.      Mental Status: She is alert and oriented to person, place, and time.   Psychiatric:         Mood and Affect: Mood normal.         Behavior: Behavior normal.         Thought Content: Thought content normal.         Judgment: Judgment normal.     CLINICAL DATA REVIEWED   reviewed previous lab results and integrated with today's visit, reviewed notes from other physicians and/or last GI encounter, reviewed previous endoscopy results and available photos, reviewed surgical pathology results from previous biopsies    ASSESSMENT  Diagnoses and all orders for this visit:    Diarrhea, unspecified type  -     Calprotectin, Fecal - Stool, Per Rectum  -     H. Pylori Antigen, Stool - Stool, Per Rectum    Generalized abdominal pain  -     Calprotectin, Fecal - Stool, Per Rectum  -     H. Pylori Antigen, Stool - Stool, Per Rectum    Other orders  -     buPROPion SR (WELLBUTRIN SR) 150 MG 12 hr tablet; Take 1 tablet by mouth Daily.  -     Levonorgestrel (Mirena, 52 MG,) 20 MCG/DAY intrauterine device IUD; 1 each by Intrauterine route Once.  -     Digestive Enzyme capsule; Take 1 capsule by mouth Daily.          PLAN    Increase fiber, fluids 64 oz  Fecal checo to r/o IBD    Return in about 6 weeks (around 5/24/2024).    I have discussed the above plan with the patient.  They verbalize understanding and are in agreement with the plan.  They have been advised to contact the office for any questions, concerns, or changes related to their health.

## 2024-04-13 ENCOUNTER — LAB (OUTPATIENT)
Dept: LAB | Facility: HOSPITAL | Age: 34
End: 2024-04-13
Payer: COMMERCIAL

## 2024-04-13 PROCEDURE — 83993 ASSAY FOR CALPROTECTIN FECAL: CPT

## 2024-04-13 PROCEDURE — 87338 HPYLORI STOOL AG IA: CPT

## 2024-04-15 LAB — H PYLORI AG STL QL IA: NEGATIVE

## 2024-04-19 LAB — CALPROTECTIN STL-MCNT: 262 UG/G (ref 0–120)

## 2024-04-23 ENCOUNTER — TELEPHONE (OUTPATIENT)
Dept: GASTROENTEROLOGY | Facility: CLINIC | Age: 34
End: 2024-04-23
Payer: COMMERCIAL

## 2024-04-23 DIAGNOSIS — R19.7 DIARRHEA, UNSPECIFIED TYPE: ICD-10-CM

## 2024-04-23 DIAGNOSIS — R10.84 GENERALIZED ABDOMINAL PAIN: Primary | ICD-10-CM

## 2024-04-23 DIAGNOSIS — R19.5 ELEVATED FECAL CALPROTECTIN: ICD-10-CM

## 2024-04-23 NOTE — TELEPHONE ENCOUNTER
----- Message from GIANNA Huang sent at 4/23/2024  8:26 AM EDT -----    ----- Message -----  From: Lab, Background User  Sent: 4/15/2024   3:09 PM EDT  To: GIANNA Huang

## 2024-04-23 NOTE — TELEPHONE ENCOUNTER
See lab results 04/12/2024.  Need to schedule EGD & Colonoscopy.    Spoke with patient.  Scheduled at [facility] 05/23/2024 at 3:15pm - arrive 2pm.  Sent prep instructions by [place].

## 2024-05-22 ENCOUNTER — ANESTHESIA EVENT (OUTPATIENT)
Dept: PERIOP | Facility: HOSPITAL | Age: 34
End: 2024-05-22
Payer: COMMERCIAL

## 2024-05-23 ENCOUNTER — HOSPITAL ENCOUNTER (OUTPATIENT)
Facility: HOSPITAL | Age: 34
Setting detail: HOSPITAL OUTPATIENT SURGERY
Discharge: HOME | End: 2024-05-23
Attending: INTERNAL MEDICINE | Admitting: INTERNAL MEDICINE
Payer: COMMERCIAL

## 2024-05-23 ENCOUNTER — ANESTHESIA (OUTPATIENT)
Dept: PERIOP | Facility: HOSPITAL | Age: 34
End: 2024-05-23
Payer: COMMERCIAL

## 2024-05-23 VITALS
BODY MASS INDEX: 36.26 KG/M2 | HEIGHT: 67 IN | SYSTOLIC BLOOD PRESSURE: 103 MMHG | OXYGEN SATURATION: 100 % | RESPIRATION RATE: 14 BRPM | HEART RATE: 73 BPM | DIASTOLIC BLOOD PRESSURE: 72 MMHG | TEMPERATURE: 97.6 F | WEIGHT: 231 LBS

## 2024-05-23 DIAGNOSIS — R19.5 ELEVATED FECAL CALPROTECTIN: ICD-10-CM

## 2024-05-23 DIAGNOSIS — R19.7 DIARRHEA, UNSPECIFIED TYPE: ICD-10-CM

## 2024-05-23 DIAGNOSIS — R10.84 GENERALIZED ABDOMINAL PAIN: ICD-10-CM

## 2024-05-23 PROCEDURE — 88305 TISSUE EXAM BY PATHOLOGIST: CPT | Performed by: INTERNAL MEDICINE

## 2024-05-23 PROCEDURE — 25810000003 LACTATED RINGERS PER 1000 ML

## 2024-05-23 PROCEDURE — 25010000002 PROPOFOL 200 MG/20ML EMULSION

## 2024-05-23 PROCEDURE — 88342 IMHCHEM/IMCYTCHM 1ST ANTB: CPT | Performed by: INTERNAL MEDICINE

## 2024-05-23 PROCEDURE — 45378 DIAGNOSTIC COLONOSCOPY: CPT | Performed by: INTERNAL MEDICINE

## 2024-05-23 PROCEDURE — 43239 EGD BIOPSY SINGLE/MULTIPLE: CPT | Performed by: INTERNAL MEDICINE

## 2024-05-23 RX ORDER — ONDANSETRON 2 MG/ML
4 INJECTION INTRAMUSCULAR; INTRAVENOUS ONCE AS NEEDED
Status: DISCONTINUED | OUTPATIENT
Start: 2024-05-23 | End: 2024-05-23 | Stop reason: HOSPADM

## 2024-05-23 RX ORDER — SODIUM CHLORIDE 9 MG/ML
40 INJECTION, SOLUTION INTRAVENOUS AS NEEDED
Status: DISCONTINUED | OUTPATIENT
Start: 2024-05-23 | End: 2024-05-23 | Stop reason: HOSPADM

## 2024-05-23 RX ORDER — OMEPRAZOLE 40 MG/1
40 CAPSULE, DELAYED RELEASE ORAL DAILY
Qty: 90 CAPSULE | Refills: 3 | Status: SHIPPED | OUTPATIENT
Start: 2024-05-23 | End: 2024-06-03 | Stop reason: SDUPTHER

## 2024-05-23 RX ORDER — SODIUM CHLORIDE, SODIUM LACTATE, POTASSIUM CHLORIDE, CALCIUM CHLORIDE 600; 310; 30; 20 MG/100ML; MG/100ML; MG/100ML; MG/100ML
9 INJECTION, SOLUTION INTRAVENOUS CONTINUOUS
Status: DISCONTINUED | OUTPATIENT
Start: 2024-05-23 | End: 2024-05-23 | Stop reason: HOSPADM

## 2024-05-23 RX ORDER — PROPOFOL 10 MG/ML
INJECTION, EMULSION INTRAVENOUS AS NEEDED
Status: DISCONTINUED | OUTPATIENT
Start: 2024-05-23 | End: 2024-05-23 | Stop reason: SURG

## 2024-05-23 RX ORDER — LIDOCAINE HYDROCHLORIDE 20 MG/ML
INJECTION, SOLUTION INFILTRATION; PERINEURAL AS NEEDED
Status: DISCONTINUED | OUTPATIENT
Start: 2024-05-23 | End: 2024-05-23 | Stop reason: SURG

## 2024-05-23 RX ORDER — PLECANATIDE 3 MG/1
3 TABLET ORAL DAILY
Qty: 90 TABLET | Refills: 3 | Status: SHIPPED | OUTPATIENT
Start: 2024-05-23 | End: 2024-05-24

## 2024-05-23 RX ORDER — LIDOCAINE HYDROCHLORIDE 10 MG/ML
0.5 INJECTION, SOLUTION INFILTRATION; PERINEURAL ONCE AS NEEDED
Status: DISCONTINUED | OUTPATIENT
Start: 2024-05-23 | End: 2024-05-23 | Stop reason: HOSPADM

## 2024-05-23 RX ORDER — SODIUM CHLORIDE, SODIUM LACTATE, POTASSIUM CHLORIDE, CALCIUM CHLORIDE 600; 310; 30; 20 MG/100ML; MG/100ML; MG/100ML; MG/100ML
100 INJECTION, SOLUTION INTRAVENOUS ONCE
Status: DISCONTINUED | OUTPATIENT
Start: 2024-05-23 | End: 2024-05-23 | Stop reason: HOSPADM

## 2024-05-23 RX ORDER — SODIUM CHLORIDE 0.9 % (FLUSH) 0.9 %
10 SYRINGE (ML) INJECTION EVERY 12 HOURS SCHEDULED
Status: DISCONTINUED | OUTPATIENT
Start: 2024-05-23 | End: 2024-05-23 | Stop reason: HOSPADM

## 2024-05-23 RX ORDER — SODIUM CHLORIDE 0.9 % (FLUSH) 0.9 %
10 SYRINGE (ML) INJECTION AS NEEDED
Status: DISCONTINUED | OUTPATIENT
Start: 2024-05-23 | End: 2024-05-23 | Stop reason: HOSPADM

## 2024-05-23 RX ORDER — DEXMEDETOMIDINE HYDROCHLORIDE 100 UG/ML
INJECTION, SOLUTION INTRAVENOUS AS NEEDED
Status: DISCONTINUED | OUTPATIENT
Start: 2024-05-23 | End: 2024-05-23 | Stop reason: SURG

## 2024-05-23 RX ADMIN — PROPOFOL 630 MG: 10 INJECTION, EMULSION INTRAVENOUS at 15:41

## 2024-05-23 RX ADMIN — DEXMEDETOMIDINE 12 MCG: 100 INJECTION, SOLUTION, CONCENTRATE INTRAVENOUS at 15:41

## 2024-05-23 RX ADMIN — SODIUM CHLORIDE, POTASSIUM CHLORIDE, SODIUM LACTATE AND CALCIUM CHLORIDE 9 ML/HR: 600; 310; 30; 20 INJECTION, SOLUTION INTRAVENOUS at 14:16

## 2024-05-23 RX ADMIN — LIDOCAINE HYDROCHLORIDE 100 MG: 20 INJECTION, SOLUTION INFILTRATION; PERINEURAL at 15:42

## 2024-05-23 RX ADMIN — DEXMEDETOMIDINE 8 MCG: 100 INJECTION, SOLUTION, CONCENTRATE INTRAVENOUS at 15:57

## 2024-05-23 NOTE — H&P
"Patient Care Team:  Peggy Pope APRN as PCP - General (Nurse Practitioner)    CHIEF COMPLAINT: dyspepsia and Diarrhea    HISTORY OF PRESENT ILLNESS:  No Previous Colon nor EGd and CT suggested TIleitis    Past Medical History:   Diagnosis Date    Asthma     GERD (gastroesophageal reflux disease)      Past Surgical History:   Procedure Laterality Date    BARIATRIC SURGERY  2012    Sleeve    CHOLECYSTECTOMY      GASTRIC SLEEVE LAPAROSCOPIC       Family History   Problem Relation Age of Onset    No Known Problems Mother     Hyperlipidemia Father     Hypertension Father      Social History     Tobacco Use    Smoking status: Never    Smokeless tobacco: Never   Vaping Use    Vaping status: Never Used   Substance Use Topics    Alcohol use: No    Drug use: Never     Medications Prior to Admission   Medication Sig Dispense Refill Last Dose    buPROPion SR (WELLBUTRIN SR) 150 MG 12 hr tablet Take 2 tablets by mouth Daily.   5/22/2024 at 1700    cetirizine (zyrTEC) 5 MG tablet Take 1 tablet by mouth Daily.   5/22/2024 at 1700    fluticasone-salmeterol (ADVAIR) 100-50 MCG/DOSE DISKUS Inhale 2 (Two) Times a Day.   5/23/2024 at 1000    omeprazole (priLOSEC) 20 MG capsule Take 1 capsule by mouth Daily.   5/22/2024 at 1700    albuterol (PROVENTIL HFA;VENTOLIN HFA) 108 (90 Base) MCG/ACT inhaler Inhale 2 puffs Every 4 (Four) Hours As Needed for Wheezing.   Unknown    Levonorgestrel (Mirena, 52 MG,) 20 MCG/DAY intrauterine device IUD 1 each by Intrauterine route Once.        Allergies:  Patient has no known allergies.    REVIEW OF SYSTEMS:  Please see the above history of present illness for pertinent positives and negatives.  The remainder of the patient's systems have been reviewed and are negative.     Vital Signs  Temp:  [98.6 °F (37 °C)] 98.6 °F (37 °C)  Heart Rate:  [92] 92  Resp:  [12] 12  BP: (123)/(76) 123/76    Flowsheet Rows      Flowsheet Row First Filed Value   Admission Height 170.2 cm (67\") Documented at " 05/22/2024 1350   Admission Weight 104 kg (230 lb) Documented at 05/22/2024 1350             Physical Exam:  Physical Exam   Constitutional: Patient appears well-developed and well-nourished and in no acute distress   HEENT:   Head: Normocephalic and atraumatic.   Eyes:  Pupils are equal, round, and reactive to light. EOM are intact. Sclerae are anicteric and non-injected.  Mouth and Throat: Patient has moist mucous membranes. Oropharynx is clear of any erythema or exudate.     Neck: Neck supple. No JVD present. No thyromegaly present. No lymphadenopathy present.  Cardiovascular: Regular rate, regular rhythm, S1 normal and S2 normal.  Exam reveals no gallop and no friction rub.  No murmur heard.  Pulmonary/Chest: Lungs are clear to auscultation bilaterally. No respiratory distress. No wheezes. No rhonchi. No rales.   Abdominal: Soft. Bowel sounds are normal. No distension and no mass. There is no hepatosplenomegaly. There is no tenderness.   Musculoskeletal: Normal Muscle tone  Extremities: No edema. Pulses are palpable in all 4 extremities.  Neurological: Patient is alert and oriented to person, place, and time. Cranial nerves II-XII are grossly intact with no focal deficits.  Skin: Skin is warm. No rash noted. Nails show no clubbing.  No cyanosis or erythema.    Debilities/Disabilities Identified: None  Emotional Behavior: Appropriate     Results Review:   I reviewed the patient's new clinical results.    Lab Results (most recent)       None            Imaging Results (Most Recent)       None          reviewed    ECG/EMG Results (most recent)       None          reviewed    Assessment & Plan   dyspepsia and diarrhea /  EGD and colonoscopy      I discussed the patient's findings and my recommendations with patient.     Ryan Euceda MD  05/23/24  15:36 EDT    Time: 10 min prior to procedure.

## 2024-05-23 NOTE — BRIEF OP NOTE
ESOPHAGOGASTRODUODENOSCOPY WITH BIOPSY, COLONOSCOPY  Progress Note    Mariana Olmstead  5/23/2024    Pre-op Diagnosis:   Generalized abdominal pain [R10.84]  Diarrhea, unspecified type [R19.7]  Elevated fecal calprotectin [R19.5]       Post-Op Diagnosis Codes:     * Generalized abdominal pain [R10.84]     * Diarrhea, unspecified type [R19.7]     * Elevated fecal calprotectin [R19.5]     * Gastritis [K29.70]     * Erosive esophagitis [K22.10]    Procedure/CPT® Codes:        Procedure(s):  ESOPHAGOGASTRODUODENOSCOPY WITH BIOPSY  COLONOSCOPY              Surgeon(s):  Ryan Euceda MD    Anesthesia: Monitored Anesthesia Care    Staff:   Circulator: Deepa Dodson RN  Scrub Person: Sylvie Staples         Estimated Blood Loss: none    Urine Voided: * No values recorded between 5/23/2024  3:38 PM and 5/23/2024  4:22 PM *    Specimens:                Specimens       ID Source Type Tests Collected By Collected At Frozen?    A Gastric, Body Tissue TISSUE PATHOLOGY EXAM   Ryan Euceda MD 5/23/24 1558     B Esophagus, Distal Tissue TISSUE PATHOLOGY EXAM   Ryan Euceda MD 5/23/24 1600                   Drains: * No LDAs found *    Findings: Normal Duodenum  Mild Gastritis-biopsy  Erosive reflux Esophagitis-Biopsy    Colon to TI Good Prep  Normal Mucosa thru-out exam        Complications: none          Ryan Euceda MD     Date: 5/23/2024  Time: 16:29 EDT

## 2024-05-23 NOTE — ANESTHESIA POSTPROCEDURE EVALUATION
Patient: Mariana Olmstead    Procedure Summary       Date: 05/23/24 Room / Location: [facility] ENDOSCOPY 1 /  [facility]    Anesthesia Start: 1539 Anesthesia Stop: 1633    Procedures:       ESOPHAGOGASTRODUODENOSCOPY WITH BIOPSY (Esophagus)      COLONOSCOPY Diagnosis:       Generalized abdominal pain      Diarrhea, unspecified type      Elevated fecal calprotectin      Gastritis      Erosive esophagitis      (Generalized abdominal pain [R10.84])      (Diarrhea, unspecified type [R19.7])      (Elevated fecal calprotectin [R19.5])    Surgeons: Ryan Euceda MD Provider: Antonella Carrizales CRNA    Anesthesia Type: MAC ASA Status: 2            Anesthesia Type: MAC    Vitals  Vitals Value Taken Time   /67 05/23/24 1650   Temp 97.6 °F (36.4 °C) 05/23/24 1645   Pulse 73 05/23/24 1657   Resp 14 05/23/24 1645   SpO2 100 % 05/23/24 1657   Vitals shown include unfiled device data.        Post Anesthesia Care and Evaluation    Patient location during evaluation: PHASE II  Patient participation: complete - patient participated  Level of consciousness: awake and alert  Pain score: 0  Pain management: adequate    Airway patency: patent  Anesthetic complications: No anesthetic complications  PONV Status: none  Cardiovascular status: acceptable  Respiratory status: acceptable  Hydration status: acceptable

## 2024-05-23 NOTE — ANESTHESIA PREPROCEDURE EVALUATION
Anesthesia Evaluation     Patient summary reviewed and Nursing notes reviewed   NPO Solid Status: > 8 hours  NPO Liquid Status: > 8 hours           Airway   Mallampati: II  TM distance: >3 FB  Neck ROM: full  No difficulty expected  Dental          Pulmonary     breath sounds clear to auscultation  (+) asthma (last inhaler 2 years.),  Cardiovascular   Exercise tolerance: good (4-7 METS)    ECG reviewed  Rhythm: regular  Rate: normal        Neuro/Psych  (+) psychiatric history Anxiety  GI/Hepatic/Renal/Endo    (+) obesity, GERD well controlled    Musculoskeletal     (+) back pain  Abdominal    Substance History - negative use     OB/GYN          Other - negative ROS       ROS/Med Hx Other: HEART RATE= 87  bpm  RR Interval= 690  ms  WV Interval= 130  ms  P Horizontal Axis= 2  deg  P Front Axis= 54  deg  QRSD Interval= 99  ms  QT Interval= 367  ms  QRS Axis= 44  deg  T Wave Axis= 37  deg  - NORMAL ECG -  Sinus rhythm  No change from previous tracing  Electronically Signed By: Natalia Meneses ([facility]) 22-Jan-2023 15:54:45  Date and Time of Study: 2023-01-22 13:34:44      Permanent retainer on bottom      Phys Exam Other: Permanent retainer on bottom              Anesthesia Plan    ASA 2     MAC     intravenous induction     Anesthetic plan, risks, benefits, and alternatives have been provided, discussed and informed consent has been obtained with: patient.    Use of blood products discussed with patient  Consented to blood products.    Plan discussed with CRNA.    CODE STATUS:

## 2024-05-24 ENCOUNTER — TELEPHONE (OUTPATIENT)
Dept: GASTROENTEROLOGY | Facility: CLINIC | Age: 34
End: 2024-05-24

## 2024-05-24 ENCOUNTER — TELEPHONE (OUTPATIENT)
Dept: GASTROENTEROLOGY | Facility: CLINIC | Age: 34
End: 2024-05-24
Payer: COMMERCIAL

## 2024-05-24 NOTE — TELEPHONE ENCOUNTER
PA sent through [facility] for trulance  Insurance requesting trial of 30days of linzess

## 2024-05-24 NOTE — TELEPHONE ENCOUNTER
HER RX FOR TRULANCE WAS DENIED BY HER INSURANCE    SHE WANTS TO KNOW WHAT HER OPTIONS ARE    PLEASE CALL HER  [phone]

## 2024-05-29 ENCOUNTER — OFFICE VISIT (OUTPATIENT)
Dept: GASTROENTEROLOGY | Facility: CLINIC | Age: 34
End: 2024-05-29
Payer: COMMERCIAL

## 2024-05-29 ENCOUNTER — LAB (OUTPATIENT)
Dept: LAB | Facility: HOSPITAL | Age: 34
End: 2024-05-29
Payer: COMMERCIAL

## 2024-05-29 VITALS
BODY MASS INDEX: 36.76 KG/M2 | DIASTOLIC BLOOD PRESSURE: 74 MMHG | WEIGHT: 234.2 LBS | SYSTOLIC BLOOD PRESSURE: 114 MMHG | HEIGHT: 67 IN

## 2024-05-29 DIAGNOSIS — R14.0 BLOATING: ICD-10-CM

## 2024-05-29 DIAGNOSIS — R10.84 GENERALIZED ABDOMINAL PAIN: ICD-10-CM

## 2024-05-29 DIAGNOSIS — K21.00 GASTROESOPHAGEAL REFLUX DISEASE WITH ESOPHAGITIS, UNSPECIFIED WHETHER HEMORRHAGE: ICD-10-CM

## 2024-05-29 DIAGNOSIS — K58.1 IRRITABLE BOWEL SYNDROME WITH CONSTIPATION: ICD-10-CM

## 2024-05-29 DIAGNOSIS — R10.84 GENERALIZED ABDOMINAL PAIN: Primary | ICD-10-CM

## 2024-05-29 PROBLEM — R19.5 ELEVATED FECAL CALPROTECTIN: Status: RESOLVED | Noted: 2024-04-23 | Resolved: 2024-05-29

## 2024-05-29 LAB
LAB AP CASE REPORT: NORMAL
PATH REPORT.FINAL DX SPEC: NORMAL
PATH REPORT.GROSS SPEC: NORMAL

## 2024-05-29 PROCEDURE — 86003 ALLG SPEC IGE CRUDE XTRC EA: CPT

## 2024-05-29 PROCEDURE — 86258 DGP ANTIBODY EACH IG CLASS: CPT

## 2024-05-29 PROCEDURE — 86364 TISS TRNSGLTMNASE EA IG CLAS: CPT

## 2024-05-29 PROCEDURE — 36415 COLL VENOUS BLD VENIPUNCTURE: CPT

## 2024-05-29 PROCEDURE — 82784 ASSAY IGA/IGD/IGG/IGM EACH: CPT

## 2024-05-29 PROCEDURE — 86231 EMA EACH IG CLASS: CPT

## 2024-05-29 NOTE — PROGRESS NOTES
"    PATIENT INFORMATION  Mariana Olmstead       - 1990    CHIEF COMPLAINT  Chief Complaint   Patient presents with    Diarrhea    Abdominal Pain       HISTORY OF PRESENT ILLNESS    Here today for EGD and Colon follow-up    2024 EGD and Colon normal duodenum, mild gastritis, grade A esophagitis, colon normal. Unfortunately path is still pending. Reviewed images with patient.     Rough after colon prep for a few days    Omeprazole Daily. Bloating after eating and cannot lose weight. Bloating no improvement.    Linzess 145 daily. Is helping, more relief, less pain, some bloating, 2 a day, soft or liquid. No bleeding or issues with urgency, does not feel so backed up.     Gastric sleeve 10 yrs  7 yrs ago cholecystectomy    Diarrhea   Associated symptoms include abdominal pain and headaches. Pertinent negatives include no vomiting.   Abdominal Pain  Associated symptoms include constipation (Improved), diarrhea and headaches. Pertinent negatives include no nausea or vomiting.       REVIEWED PERTINENT RESULTS/ LABS  No results found for: \"CASEREPORT\", \"FINALDX\"  Lab Results   Component Value Date    HGB 13.4 2024    MCV 89.8 2024     2024    ALT 14 2024    AST 13 2024    HGBA1C 5.0 2023    FERRITIN 27.2 2023    IRON 95 2023    TIBC 303 2023      No results found.    REVIEW OF SYSTEMS  Review of Systems   Constitutional: Negative.    Eyes: Negative.    Respiratory: Negative.     Cardiovascular: Negative.    Gastrointestinal:  Positive for abdominal pain, constipation (Improved) and diarrhea. Negative for nausea and vomiting.   Endocrine: Negative.    Genitourinary: Negative.    Musculoskeletal:  Positive for back pain.   Skin: Negative.    Allergic/Immunologic: Negative.    Neurological:  Positive for headaches.   Hematological: Negative.    Psychiatric/Behavioral:  The patient is nervous/anxious.          ACTIVE PROBLEMS  Patient Active Problem List "    Diagnosis     Generalized abdominal pain [R10.84]     Diarrhea [R19.7]          PAST MEDICAL HISTORY  Past Medical History:   Diagnosis Date    Asthma     GERD (gastroesophageal reflux disease)          SURGICAL HISTORY  Past Surgical History:   Procedure Laterality Date    BARIATRIC SURGERY  2012    Sleeve    CHOLECYSTECTOMY      COLONOSCOPY N/A 5/23/2024    Procedure: COLONOSCOPY;  Surgeon: Ryan Euceda MD;  Location: [facility] OR;  Service: Gastroenterology;  Laterality: N/A;    ENDOSCOPY N/A 5/23/2024    Procedure: ESOPHAGOGASTRODUODENOSCOPY WITH BIOPSY;  Surgeon: Ryan Euceda MD;  Location: [facility] OR;  Service: Gastroenterology;  Laterality: N/A;  Gastritis; Esophagitis; Gastric bx; Distal esophagus bx    GASTRIC SLEEVE LAPAROSCOPIC           FAMILY HISTORY  Family History   Problem Relation Age of Onset    No Known Problems Mother     Hyperlipidemia Father     Hypertension Father          SOCIAL HISTORY  Social History     Occupational History    Not on file   Tobacco Use    Smoking status: Never    Smokeless tobacco: Never   Vaping Use    Vaping status: Never Used   Substance and Sexual Activity    Alcohol use: No    Drug use: Never    Sexual activity: Defer         CURRENT MEDICATIONS    Current Outpatient Medications:     albuterol (PROVENTIL HFA;VENTOLIN HFA) 108 (90 Base) MCG/ACT inhaler, Inhale 2 puffs Every 4 (Four) Hours As Needed for Wheezing., Disp: , Rfl:     buPROPion SR (WELLBUTRIN SR) 150 MG 12 hr tablet, Take 2 tablets by mouth Daily., Disp: , Rfl:     cetirizine (zyrTEC) 5 MG tablet, Take 1 tablet by mouth Daily., Disp: , Rfl:     fluticasone-salmeterol (ADVAIR) 100-50 MCG/DOSE DISKUS, Inhale 2 (Two) Times a Day., Disp: , Rfl:     Levonorgestrel (Mirena, 52 MG,) 20 MCG/DAY intrauterine device IUD, To be inserted one time by prescriber. Route intrauterine., Disp: , Rfl:     linaclotide (LINZESS) 145 MCG capsule capsule, Take 1 capsule by mouth Every Morning Before  "Breakfast., Disp: 30 capsule, Rfl: 11    omeprazole (priLOSEC) 40 MG capsule, Take 1 capsule by mouth Daily., Disp: 90 capsule, Rfl: 3    ALLERGIES  Patient has no known allergies.    VITALS  Vitals:    05/29/24 0936   BP: 114/74   BP Location: Left arm   Patient Position: Sitting   Cuff Size: Large Adult   Weight: 106 kg (234 lb 3.2 oz)   Height: 170.2 cm (67.01\")       PHYSICAL EXAM  Debilities/Disabilities Identified: None  Emotional Behavior: Appropriate  Wt Readings from Last 3 Encounters:   05/29/24 106 kg (234 lb 3.2 oz)   05/23/24 105 kg (231 lb)   04/12/24 107 kg (235 lb 3.2 oz)     Ht Readings from Last 1 Encounters:   05/29/24 170.2 cm (67.01\")     Body mass index is 36.67 kg/m².  Physical Exam  Constitutional:       General: She is not in acute distress.     Appearance: Normal appearance. She is not ill-appearing.   HENT:      Head: Normocephalic and atraumatic.      Mouth/Throat:      Mouth: Mucous membranes are moist.      Pharynx: No posterior oropharyngeal erythema.   Eyes:      General: No scleral icterus.  Cardiovascular:      Rate and Rhythm: Normal rate and regular rhythm.      Heart sounds: Normal heart sounds.   Pulmonary:      Effort: Pulmonary effort is normal.      Breath sounds: Normal breath sounds.   Abdominal:      General: Abdomen is flat. Bowel sounds are normal. There is no distension.      Palpations: Abdomen is soft. There is no mass.      Tenderness: There is no abdominal tenderness in the right lower quadrant and left lower quadrant. There is no guarding or rebound. Negative signs include Scherer's sign.      Hernia: No hernia is present.   Musculoskeletal:      Cervical back: Neck supple.   Skin:     General: Skin is warm.      Capillary Refill: Capillary refill takes less than 2 seconds.   Neurological:      General: No focal deficit present.      Mental Status: She is alert and oriented to person, place, and time.   Psychiatric:         Mood and Affect: Mood normal.         " Behavior: Behavior normal.         Thought Content: Thought content normal.         Judgment: Judgment normal.       CLINICAL DATA REVIEWED   reviewed previous lab results and integrated with today's visit, reviewed notes from other physicians and/or last GI encounter, reviewed previous endoscopy results and available photos, reviewed surgical pathology results from previous biopsies    ASSESSMENT  Diagnoses and all orders for this visit:    Generalized abdominal pain  -     Celiac Comprehensive Panel  -     Food Allergy Profile; Future    Bloating  -     Celiac Comprehensive Panel  -     Food Allergy Profile; Future    Gastroesophageal reflux disease with esophagitis, unspecified whether hemorrhage    Irritable bowel syndrome with constipation    Other orders  -     linaclotide (LINZESS) 145 MCG capsule capsule; Take 1 capsule by mouth Every Morning Before Breakfast.          PLAN    Sucrase test given  Dairy tolerance test at home  Celiac and allergen profile at patient request  Continue linzess  PRN simethicone    Return in about 3 months (around 8/29/2024).    I have discussed the above plan with the patient.  They verbalize understanding and are in agreement with the plan.  They have been advised to contact the office for any questions, concerns, or changes related to their health.

## 2024-05-30 LAB
ENDOMYSIUM IGA SER QL: NEGATIVE
GLIADIN PEPTIDE IGA SER-ACNC: 4 UNITS (ref 0–19)
GLIADIN PEPTIDE IGG SER-ACNC: 2 UNITS (ref 0–19)
IGA SERPL-MCNC: 116 MG/DL (ref 87–352)
TTG IGA SER-ACNC: <2 U/ML (ref 0–3)
TTG IGG SER-ACNC: 5 U/ML (ref 0–5)

## 2024-05-31 LAB
CLAM IGE QN: <0.1 KU/L
CODFISH IGE QN: <0.1 KU/L
CONV CLASS DESCRIPTION: ABNORMAL
CORN IGE QN: <0.1 KU/L
COW MILK IGE QN: 0.54 KU/L
EGG WHITE IGE QN: <0.1 KU/L
PEANUT IGE QN: <0.1 KU/L
SCALLOP IGE QN: <0.1 KU/L
SESAME SEED IGE QN: <0.1 KU/L
SHRIMP IGE QN: <0.1 KU/L
SOYBEAN IGE QN: <0.1 KU/L
WALNUT IGE QN: <0.1 KU/L
WHEAT IGE QN: <0.1 KU/L

## 2024-06-03 ENCOUNTER — TELEPHONE (OUTPATIENT)
Dept: GASTROENTEROLOGY | Facility: CLINIC | Age: 34
End: 2024-06-03
Payer: COMMERCIAL

## 2024-06-03 DIAGNOSIS — R10.84 GENERALIZED ABDOMINAL PAIN: Primary | ICD-10-CM

## 2024-06-03 RX ORDER — OMEPRAZOLE 40 MG/1
40 CAPSULE, DELAYED RELEASE ORAL DAILY
Qty: 90 CAPSULE | Refills: 3 | Status: SHIPPED | OUTPATIENT
Start: 2024-06-03

## 2024-06-03 NOTE — TELEPHONE ENCOUNTER
PA sent through [facility] Linzess 145mcg      Trulance Denied  Samples of Linzess given for trial with positive results

## 2024-06-12 ENCOUNTER — PATIENT MESSAGE (OUTPATIENT)
Dept: GASTROENTEROLOGY | Facility: CLINIC | Age: 34
End: 2024-06-12

## 2024-06-12 ENCOUNTER — TELEPHONE (OUTPATIENT)
Dept: GASTROENTEROLOGY | Facility: CLINIC | Age: 34
End: 2024-06-12
Payer: COMMERCIAL

## 2024-06-12 NOTE — TELEPHONE ENCOUNTER
Per JS: Sucrose digestion low on test. Would like to start sucraid and get her enrolled as they will provide education regarding sucrase and isomaltase deficiency and diet.     Attempted to reach pt will sent in my chart    [facility]

## 2024-06-17 NOTE — TELEPHONE ENCOUNTER
Request Reference Number: [id number]. SUCRAID SOL 8500/ML is approved through 06/14/2025. Your patient may now fill this prescription and it will be covered.

## 2024-08-30 ENCOUNTER — OFFICE VISIT (OUTPATIENT)
Dept: GASTROENTEROLOGY | Facility: CLINIC | Age: 34
End: 2024-08-30
Payer: COMMERCIAL

## 2024-08-30 VITALS
DIASTOLIC BLOOD PRESSURE: 80 MMHG | SYSTOLIC BLOOD PRESSURE: 108 MMHG | HEIGHT: 67 IN | WEIGHT: 233 LBS | BODY MASS INDEX: 36.57 KG/M2

## 2024-08-30 DIAGNOSIS — Z91.011 MILK ALLERGY: ICD-10-CM

## 2024-08-30 DIAGNOSIS — K58.1 IRRITABLE BOWEL SYNDROME WITH CONSTIPATION: Primary | ICD-10-CM

## 2024-08-30 DIAGNOSIS — K21.00 GASTROESOPHAGEAL REFLUX DISEASE WITH ESOPHAGITIS, UNSPECIFIED WHETHER HEMORRHAGE: ICD-10-CM

## 2024-08-30 RX ORDER — SACROSIDASE 8500 [IU]/ML
SOLUTION ORAL
COMMUNITY
Start: 2024-07-12 | End: 2024-08-30

## 2024-08-30 RX ORDER — RIMEGEPANT SULFATE 75 MG/75MG
TABLET, ORALLY DISINTEGRATING ORAL
COMMUNITY
Start: 2024-06-16

## 2024-08-30 NOTE — PROGRESS NOTES
PATIENT INFORMATION  Mariana Olmstead         - 1990    CHIEF COMPLAINT  Chief Complaint   Patient presents with    Abdominal Pain    Bloated    Heartburn    Irritable Bowel Syndrome    Constipation       HISTORY OF PRESENT ILLNESS  Here today for GERD and IBS-C follow-up     Omeprazole Daily.  Bloating persistent, sucrase test positive after LOV. Started on sucraid, but no response despite 2 month trial so stopped. Allergy panel positive for cow's milk. Has mostly eliminated dairy, some cheese. HB and indigestion if not taking meds at same time.     Linzess 145 daily. Per LOV: Is helping, more relief, less pain, some bloating, 2 a day, soft or liquid. No bleeding or issues with urgency, does not feel so backed up. TODAY: Feeling so much better now that she is taking it, no more bloating or cramping. BM once a day and feeling great.     Gastric sleeve 10 yrs  7 yrs ago cholecystectomy    2024 EGD and Colon normal duodenum, mild gastritis, grade A esophagitis, colon normal.          REVIEWED PERTINENT RESULTS/ LABS  Lab Results   Component Value Date    CASEREPORT  2024     Surgical Pathology Report                         Case: [id number]                                  Authorizing Provider:  Ryan Euceda        Collected:           2024 03:58 PM                                 MD Telly                                                                   Ordering [facility]   Received:            2024 04:47 PM                                 OR                                                                           Pathologist:           Bradford Asher MD                                                          Specimens:   1) - Gastric, Body                                                                                  2) - Esophagus, Distal                                                                     FINALDX  2024     1.   Stomach, biopsy: Antral type gastric mucosa with   A. Mild to moderate reactive/chemical gastropathy with patchy mild active inflammation and repair.   B. No metaplasia, dysplasia nor malignancy.   C. No H. pylori-like organisms identified by routine or immunohistochemical staining.    2.  Esophagus, distal, biopsy: Squamoglandular mucosa and submucosa with    A.  Mild predominantly chronic inflammation; no significant eosinophilia.    B. No definitive well-developed goblet cell metaplasia identified by routine staining.   C. No dysplasia nor malignancy.                 D. No viral inclusions nor fungal organisms identified.       Lab Results   Component Value Date    HGB 13.4 04/01/2024    MCV 89.8 04/01/2024     04/01/2024    ALT 14 04/01/2024    AST 13 04/01/2024    HGBA1C 5.0 11/17/2023    FERRITIN 27.2 11/17/2023    IRON 95 11/17/2023    TIBC 303 11/17/2023      No results found.    REVIEW OF SYSTEMS  Review of Systems   Constitutional:  Negative for activity change, chills, fever and unexpected weight change.   HENT:  Negative for congestion.    Eyes:  Negative for visual disturbance.   Respiratory:  Negative for shortness of breath.    Cardiovascular:  Negative for chest pain and palpitations.   Gastrointestinal:  Positive for diarrhea. Negative for abdominal pain and blood in stool.        Reflux   Endocrine: Negative for cold intolerance and heat intolerance.   Genitourinary:  Negative for hematuria.   Musculoskeletal:  Negative for gait problem.   Skin:  Negative for color change.   Allergic/Immunologic: Negative for immunocompromised state.   Neurological:  Negative for weakness and light-headedness.   Hematological:  Negative for adenopathy.   Psychiatric/Behavioral:  Negative for sleep disturbance. The patient is not nervous/anxious.          ACTIVE PROBLEMS  Patient Active Problem List    Diagnosis     Generalized abdominal pain [R10.84]     Diarrhea [R19.7]          PAST MEDICAL HISTORY  Past  Medical History:   Diagnosis Date    Asthma     GERD (gastroesophageal reflux disease)          SURGICAL HISTORY  Past Surgical History:   Procedure Laterality Date    BARIATRIC SURGERY  2012    Sleeve    CHOLECYSTECTOMY      COLONOSCOPY N/A 05/23/2024    Procedure: COLONOSCOPY;  Surgeon: Ryan Euceda MD;  Location: [facility] OR;  Service: Gastroenterology;  Laterality: N/A;    ENDOSCOPY N/A 05/23/2024    Procedure: ESOPHAGOGASTRODUODENOSCOPY WITH BIOPSY;  Surgeon: Ryan Euceda MD;  Location: [facility] OR;  Service: Gastroenterology;  Laterality: N/A;  Gastritis; Esophagitis; Gastric bx; Distal esophagus bx    GASTRIC SLEEVE LAPAROSCOPIC           FAMILY HISTORY  Family History   Problem Relation Age of Onset    No Known Problems Mother     Hyperlipidemia Father     Hypertension Father     Colon cancer Maternal Grandfather          SOCIAL HISTORY  Social History     Occupational History    Not on file   Tobacco Use    Smoking status: Never    Smokeless tobacco: Never   Vaping Use    Vaping status: Never Used   Substance and Sexual Activity    Alcohol use: No    Drug use: Never    Sexual activity: Yes     Partners: Male     Birth control/protection: I.U.D.         CURRENT MEDICATIONS    Current Outpatient Medications:     albuterol (PROVENTIL HFA;VENTOLIN HFA) 108 (90 Base) MCG/ACT inhaler, Inhale 2 puffs Every 4 (Four) Hours As Needed for Wheezing., Disp: , Rfl:     buPROPion SR (WELLBUTRIN SR) 150 MG 12 hr tablet, Take 2 tablets by mouth Daily., Disp: , Rfl:     cetirizine (zyrTEC) 5 MG tablet, Take 1 tablet by mouth Daily., Disp: , Rfl:     fluticasone-salmeterol (ADVAIR) 100-50 MCG/DOSE DISKUS, Inhale 2 (Two) Times a Day., Disp: , Rfl:     Levonorgestrel (Mirena, 52 MG,) 20 MCG/DAY intrauterine device IUD, To be inserted one time by prescriber. Route intrauterine., Disp: , Rfl:     linaclotide (LINZESS) 145 MCG capsule capsule, Take 1 capsule by mouth Every Morning Before Breakfast., Disp:  "90 capsule, Rfl: 3    Nurtec 75 MG dispersible tablet, DISSOLVE 1 TABLET BY MOUTH ONCE DAILY AS NEEDED FOR MIGRAINE HEADACHE, Disp: , Rfl:     omeprazole (priLOSEC) 40 MG capsule, Take 1 capsule by mouth Daily., Disp: 90 capsule, Rfl: 3    ALLERGIES  Patient has no known allergies.    VITALS  Vitals:    08/30/24 1028   BP: 108/80   BP Location: Left arm   Patient Position: Sitting   Cuff Size: Adult   Weight: 106 kg (233 lb)   Height: 170.2 cm (67\")       PHYSICAL EXAM  Debilities/Disabilities Identified: None  Emotional Behavior: Appropriate  Wt Readings from Last 3 Encounters:   08/30/24 106 kg (233 lb)   05/29/24 106 kg (234 lb 3.2 oz)   05/23/24 105 kg (231 lb)     Ht Readings from Last 1 Encounters:   08/30/24 170.2 cm (67\")     Body mass index is 36.49 kg/m².  Physical Exam  Constitutional:       General: She is not in acute distress.     Appearance: Normal appearance. She is not ill-appearing.   HENT:      Head: Normocephalic and atraumatic.      Mouth/Throat:      Mouth: Mucous membranes are moist.      Pharynx: No posterior oropharyngeal erythema.   Eyes:      General: No scleral icterus.  Cardiovascular:      Rate and Rhythm: Normal rate and regular rhythm.      Heart sounds: Normal heart sounds.   Pulmonary:      Effort: Pulmonary effort is normal.      Breath sounds: Normal breath sounds.   Abdominal:      General: Abdomen is flat. Bowel sounds are normal. There is no distension.      Palpations: Abdomen is soft. There is no mass.      Tenderness: There is no abdominal tenderness. There is no guarding or rebound. Negative signs include Scherer's sign.      Hernia: No hernia is present.   Musculoskeletal:      Cervical back: Neck supple.   Skin:     General: Skin is warm.      Capillary Refill: Capillary refill takes less than 2 seconds.   Neurological:      General: No focal deficit present.      Mental Status: She is alert and oriented to person, place, and time.   Psychiatric:         Mood and Affect: " Mood normal.         Behavior: Behavior normal.         Thought Content: Thought content normal.         Judgment: Judgment normal.         CLINICAL DATA REVIEWED   reviewed previous lab results and integrated with today's visit, reviewed notes from other physicians and/or last GI encounter, reviewed previous endoscopy results and available photos, reviewed surgical pathology results from previous biopsies    ASSESSMENT  Diagnoses and all orders for this visit:    Irritable bowel syndrome with constipation    Gastroesophageal reflux disease with esophagitis, unspecified whether hemorrhage    Milk allergy    Other orders  -     Nurtec 75 MG dispersible tablet; DISSOLVE 1 TABLET BY MOUTH ONCE DAILY AS NEEDED FOR MIGRAINE HEADACHE  -     Discontinue: Sucraid 8500 UNIT/ML solution          PLAN    Continue avoiding dairy  IBS-C: Continue linzess daily  GERD/Gastritis: Continue omeprazole daily    Return in about 6 months (around 2/28/2025).    I have discussed the above plan with the patient.  They verbalize understanding and are in agreement with the plan.  They have been advised to contact the office for any questions, concerns, or changes related to their health.

## 2024-09-09 ENCOUNTER — TELEPHONE (OUTPATIENT)
Dept: GASTROENTEROLOGY | Facility: CLINIC | Age: 34
End: 2024-09-09
Payer: COMMERCIAL

## 2024-09-09 NOTE — TELEPHONE ENCOUNTER
Notification from [facility] made 6 attempts to reach pt to schedule delivery for sucraid- scanned into media

## 2024-09-23 DIAGNOSIS — K58.1 IRRITABLE BOWEL SYNDROME WITH CONSTIPATION: Primary | ICD-10-CM

## 2024-10-24 ENCOUNTER — TELEPHONE (OUTPATIENT)
Dept: GASTROENTEROLOGY | Facility: CLINIC | Age: 34
End: 2024-10-24
Payer: COMMERCIAL

## 2024-10-24 NOTE — TELEPHONE ENCOUNTER
Received fax stating [facility] has been unresponsive to scheduling delivery for Sucraid. Prescription has been replaced on hold. Will send [employer] message to patient.

## 2025-02-28 ENCOUNTER — OFFICE VISIT (OUTPATIENT)
Dept: GASTROENTEROLOGY | Facility: CLINIC | Age: 35
End: 2025-02-28
Payer: COMMERCIAL

## 2025-02-28 VITALS
SYSTOLIC BLOOD PRESSURE: 110 MMHG | DIASTOLIC BLOOD PRESSURE: 70 MMHG | HEIGHT: 67 IN | BODY MASS INDEX: 37.39 KG/M2 | WEIGHT: 238.2 LBS

## 2025-02-28 DIAGNOSIS — K58.1 IRRITABLE BOWEL SYNDROME WITH CONSTIPATION: Primary | ICD-10-CM

## 2025-02-28 DIAGNOSIS — K21.00 GASTROESOPHAGEAL REFLUX DISEASE WITH ESOPHAGITIS WITHOUT HEMORRHAGE: ICD-10-CM

## 2025-02-28 RX ORDER — VONOPRAZAN FUMARATE 26.72 MG/1
20 TABLET ORAL DAILY
Qty: 30 TABLET | Refills: 1 | COMMUNITY
Start: 2025-02-28 | End: 2025-02-28 | Stop reason: SDUPTHER

## 2025-02-28 RX ORDER — VONOPRAZAN FUMARATE 26.72 MG/1
20 TABLET ORAL DAILY
Qty: 30 TABLET | Refills: 1 | Status: SHIPPED | OUTPATIENT
Start: 2025-02-28

## 2025-02-28 NOTE — PROGRESS NOTES
PATIENT INFORMATION  Mariana Olmstead       - 1990    CHIEF COMPLAINT  Chief Complaint   Patient presents with    Irritable Bowel Syndrome    Heartburn    Milk allergy       HISTORY OF PRESENT ILLNESS    Here today for GERD and IBS-C follow-up     Omeprazole Daily.  Bloating persistent, sucrase test positive after LOV. Started on sucraid, but no response despite 2 month trial so stopped. Allergy panel positive for cow's milk. Has mostly eliminated dairy, some cheese. HB and indigestion if not taking meds at same time. TODAY: HB not as well controlled, 2 days a week after lunch and persistent, has not identified triggers other red sauce. Only occasional NSAIDS.     IBS-C: Per LOV: Feeling so much better now that she is taking it, no more bloating or cramping. BM once a day and feeling great. Messaged received since lov to increase dose of linzess to 290. TODAY: doing better on 290 mcg and bowels moving every day and easy and well controlled without bloating or discomfort.     Gastric sleeve 10 yrs  7 yrs ago cholecystectomy     2024 EGD and Colon normal duodenum, mild gastritis, grade A esophagitis, colon normal.          REVIEWED PERTINENT RESULTS/ LABS  Lab Results   Component Value Date    CASEREPORT  2024     Surgical Pathology Report                         Case: [id number]                                  Authorizing Provider:  Ryan Euceda        Collected:           2024 03:58 PM                                 MD Telly                                                                   Ordering Location:     [facility]   Received:            2024 04:47 PM                                 OR                                                                           Pathologist:           Bradford Asher MD                                                          Specimens:   1) - Gastric, Body                                                                                   2) - Esophagus, Distal                                                                     FINALDX  05/23/2024     1.  Stomach, biopsy: Antral type gastric mucosa with   A. Mild to moderate reactive/chemical gastropathy with patchy mild active inflammation and repair.   B. No metaplasia, dysplasia nor malignancy.   C. No H. pylori-like organisms identified by routine or immunohistochemical staining.    2.  Esophagus, distal, biopsy: Squamoglandular mucosa and submucosa with    A.  Mild predominantly chronic inflammation; no significant eosinophilia.    B. No definitive well-developed goblet cell metaplasia identified by routine staining.   C. No dysplasia nor malignancy.                 D. No viral inclusions nor fungal organisms identified.       Lab Results   Component Value Date    HGB 13.4 04/01/2024    MCV 89.8 04/01/2024     04/01/2024    ALT 14 04/01/2024    AST 13 04/01/2024    HGBA1C 5.0 11/17/2023    FERRITIN 27.2 11/17/2023    IRON 95 11/17/2023    TIBC 303 11/17/2023      No results found.    REVIEW OF SYSTEMS  Review of Systems      ACTIVE PROBLEMS  Patient Active Problem List    Diagnosis     Generalized abdominal pain [R10.84]     Diarrhea [R19.7]          PAST MEDICAL HISTORY  Past Medical History:   Diagnosis Date    Asthma     GERD (gastroesophageal reflux disease)          SURGICAL HISTORY  Past Surgical History:   Procedure Laterality Date    BARIATRIC SURGERY  2012    Sleeve    CHOLECYSTECTOMY      COLONOSCOPY N/A 05/23/2024    Procedure: COLONOSCOPY;  Surgeon: Ryan Euceda MD;  Location: [facility] OR;  Service: Gastroenterology;  Laterality: N/A;    ENDOSCOPY N/A 05/23/2024    Procedure: ESOPHAGOGASTRODUODENOSCOPY WITH BIOPSY;  Surgeon: Ryan Euceda MD;  Location: [facility] OR;  Service: Gastroenterology;  Laterality: N/A;  Gastritis; Esophagitis; Gastric bx; Distal esophagus bx    GASTRIC SLEEVE LAPAROSCOPIC           FAMILY HISTORY  Family  "History   Problem Relation Age of Onset    No Known Problems Mother     Hyperlipidemia Father     Hypertension Father     Colon cancer Maternal Grandfather          SOCIAL HISTORY  Social History     Occupational History    Not on file   Tobacco Use    Smoking status: Never    Smokeless tobacco: Never   Vaping Use    Vaping status: Never Used   Substance and Sexual Activity    Alcohol use: No    Drug use: Never    Sexual activity: Yes     Partners: Male     Birth control/protection: I.U.D.         CURRENT MEDICATIONS    Current Outpatient Medications:     albuterol (PROVENTIL HFA;VENTOLIN HFA) 108 (90 Base) MCG/ACT inhaler, Inhale 2 puffs Every 4 (Four) Hours As Needed for Wheezing., Disp: , Rfl:     buPROPion SR (WELLBUTRIN SR) 150 MG 12 hr tablet, Take 2 tablets by mouth Daily., Disp: , Rfl:     cetirizine (zyrTEC) 5 MG tablet, Take 1 tablet by mouth Daily., Disp: , Rfl:     fluticasone-salmeterol (ADVAIR) 100-50 MCG/DOSE DISKUS, Inhale 2 (Two) Times a Day., Disp: , Rfl:     Levonorgestrel (Mirena, 52 MG,) 20 MCG/DAY intrauterine device IUD, To be inserted one time by prescriber. Route intrauterine., Disp: , Rfl:     linaclotide (LINZESS) 290 MCG capsule capsule, Take 1 capsule by mouth Every Morning Before Breakfast., Disp: 90 capsule, Rfl: 1    Nurtec 75 MG dispersible tablet, DISSOLVE 1 TABLET BY MOUTH ONCE DAILY AS NEEDED FOR MIGRAINE HEADACHE, Disp: , Rfl:     Vonoprazan Fumarate (Voquezna) 20 MG tablet, Take 1 tablet by mouth Daily., Disp: 30 tablet, Rfl: 1    ALLERGIES  Patient has no known allergies.    VITALS  Vitals:    02/28/25 1105   BP: 110/70   BP Location: Left arm   Patient Position: Sitting   Cuff Size: Large Adult   Weight: 108 kg (238 lb 3.2 oz)   Height: 170.2 cm (67\")       PHYSICAL EXAM  Debilities/Disabilities Identified: None  Emotional Behavior: Appropriate  Wt Readings from Last 3 Encounters:   02/28/25 108 kg (238 lb 3.2 oz)   08/30/24 106 kg (233 lb)   05/29/24 106 kg (234 lb 3.2 oz) " "    Ht Readings from Last 1 Encounters:   02/28/25 170.2 cm (67\")     Body mass index is 37.31 kg/m².  Physical Exam  Constitutional:       General: She is not in acute distress.     Appearance: Normal appearance. She is not ill-appearing.   HENT:      Head: Normocephalic and atraumatic.      Mouth/Throat:      Mouth: Mucous membranes are moist.      Pharynx: No posterior oropharyngeal erythema.   Eyes:      General: No scleral icterus.  Cardiovascular:      Rate and Rhythm: Normal rate and regular rhythm.      Heart sounds: Normal heart sounds.   Pulmonary:      Effort: Pulmonary effort is normal.      Breath sounds: Normal breath sounds.   Abdominal:      General: Abdomen is flat. Bowel sounds are normal. There is no distension.      Palpations: Abdomen is soft. There is no mass.      Tenderness: There is no abdominal tenderness. There is no guarding or rebound. Negative signs include Scherer's sign.      Hernia: No hernia is present.   Musculoskeletal:      Cervical back: Neck supple.   Skin:     General: Skin is warm.      Capillary Refill: Capillary refill takes less than 2 seconds.   Neurological:      General: No focal deficit present.      Mental Status: She is alert and oriented to person, place, and time.   Psychiatric:         Mood and Affect: Mood normal.         Behavior: Behavior normal.         Thought Content: Thought content normal.         Judgment: Judgment normal.         CLINICAL DATA REVIEWED   reviewed previous lab results and integrated with today's visit, reviewed notes from other physicians and/or last GI encounter, reviewed previous endoscopy results and available photos, reviewed surgical pathology results from previous biopsies    ASSESSMENT  Diagnoses and all orders for this visit:    Irritable bowel syndrome with constipation    Gastroesophageal reflux disease with esophagitis without hemorrhage    Other orders  -     Vonoprazan Fumarate (Voquezna) 20 MG tablet; Take 1 tablet by mouth " Daily.          PLAN    GERD: Will switch to voquezna, if persistent symptoms would rule out HP, however suspect due to low threshold of control at LOV  IBS-C: Continue daily linzess    Return in about 3 months (around 5/28/2025).    I have discussed the above plan with the patient.  They verbalize understanding and are in agreement with the plan.  They have been advised to contact the office for any questions, concerns, or changes related to their health.

## 2025-03-06 DIAGNOSIS — K58.1 IRRITABLE BOWEL SYNDROME WITH CONSTIPATION: ICD-10-CM

## 2025-03-06 RX ORDER — LINACLOTIDE 290 UG/1
290 CAPSULE, GELATIN COATED ORAL
Qty: 90 CAPSULE | Refills: 0 | Status: SHIPPED | OUTPATIENT
Start: 2025-03-06

## 2025-04-09 DIAGNOSIS — R10.84 GENERALIZED ABDOMINAL PAIN: ICD-10-CM

## 2025-04-09 RX ORDER — OMEPRAZOLE 40 MG/1
40 CAPSULE, DELAYED RELEASE ORAL DAILY
Qty: 90 CAPSULE | Refills: 3 | OUTPATIENT
Start: 2025-04-09

## 2025-04-10 DIAGNOSIS — R14.0 BLOATING: ICD-10-CM

## 2025-04-10 DIAGNOSIS — K21.00 GASTROESOPHAGEAL REFLUX DISEASE WITH ESOPHAGITIS WITHOUT HEMORRHAGE: ICD-10-CM

## 2025-04-10 DIAGNOSIS — K58.1 IRRITABLE BOWEL SYNDROME WITH CONSTIPATION: ICD-10-CM

## 2025-04-10 DIAGNOSIS — R10.84 GENERALIZED ABDOMINAL PAIN: Primary | ICD-10-CM

## 2025-04-10 RX ORDER — DEXLANSOPRAZOLE 60 MG/1
60 CAPSULE, DELAYED RELEASE ORAL
Qty: 30 CAPSULE | Refills: 3 | Status: SHIPPED | OUTPATIENT
Start: 2025-04-10 | End: 2025-05-10

## 2025-04-10 RX ORDER — FAMOTIDINE 40 MG/1
40 TABLET, FILM COATED ORAL NIGHTLY
Qty: 90 TABLET | Refills: 3 | Status: SHIPPED | OUTPATIENT
Start: 2025-04-10

## 2025-04-10 RX ORDER — LINACLOTIDE 290 UG/1
290 CAPSULE, GELATIN COATED ORAL
Qty: 90 CAPSULE | Refills: 0 | Status: SHIPPED | OUTPATIENT
Start: 2025-04-10

## 2025-04-11 ENCOUNTER — TELEPHONE (OUTPATIENT)
Dept: GASTROENTEROLOGY | Facility: CLINIC | Age: 35
End: 2025-04-11
Payer: COMMERCIAL

## 2025-04-16 RX ORDER — PANTOPRAZOLE SODIUM 40 MG/1
40 TABLET, DELAYED RELEASE ORAL
Qty: 60 TABLET | Refills: 3 | Status: SHIPPED | OUTPATIENT
Start: 2025-04-16 | End: 2025-04-18 | Stop reason: SDUPTHER

## 2025-04-18 RX ORDER — PANTOPRAZOLE SODIUM 40 MG/1
40 TABLET, DELAYED RELEASE ORAL
Qty: 60 TABLET | Refills: 3 | Status: SHIPPED | OUTPATIENT
Start: 2025-04-18

## 2025-04-28 ENCOUNTER — LAB (OUTPATIENT)
Dept: LAB | Facility: HOSPITAL | Age: 35
End: 2025-04-28
Payer: COMMERCIAL

## 2025-04-28 DIAGNOSIS — K21.00 GASTROESOPHAGEAL REFLUX DISEASE WITH ESOPHAGITIS WITHOUT HEMORRHAGE: ICD-10-CM

## 2025-04-28 DIAGNOSIS — R14.0 BLOATING: ICD-10-CM

## 2025-04-28 DIAGNOSIS — R10.84 GENERALIZED ABDOMINAL PAIN: Primary | ICD-10-CM

## 2025-04-29 ENCOUNTER — LAB (OUTPATIENT)
Dept: LAB | Facility: HOSPITAL | Age: 35
End: 2025-04-29
Payer: COMMERCIAL

## 2025-04-29 PROCEDURE — 87338 HPYLORI STOOL AG IA: CPT

## 2025-05-01 DIAGNOSIS — K21.00 GASTROESOPHAGEAL REFLUX DISEASE WITH ESOPHAGITIS WITHOUT HEMORRHAGE: ICD-10-CM

## 2025-05-01 LAB — H PYLORI AG STL QL IA: NEGATIVE

## 2025-05-01 RX ORDER — PANTOPRAZOLE SODIUM 40 MG/1
40 TABLET, DELAYED RELEASE ORAL
Qty: 60 TABLET | Refills: 3 | Status: SHIPPED | OUTPATIENT
Start: 2025-05-01

## 2025-05-01 RX ORDER — FAMOTIDINE 40 MG/1
40 TABLET, FILM COATED ORAL NIGHTLY
Qty: 90 TABLET | Refills: 3 | Status: SHIPPED | OUTPATIENT
Start: 2025-05-01

## 2025-05-30 ENCOUNTER — OFFICE VISIT (OUTPATIENT)
Dept: GASTROENTEROLOGY | Facility: CLINIC | Age: 35
End: 2025-05-30
Payer: COMMERCIAL

## 2025-05-30 VITALS
BODY MASS INDEX: 38.83 KG/M2 | DIASTOLIC BLOOD PRESSURE: 60 MMHG | HEIGHT: 67 IN | WEIGHT: 247.4 LBS | SYSTOLIC BLOOD PRESSURE: 124 MMHG

## 2025-05-30 DIAGNOSIS — K58.1 IRRITABLE BOWEL SYNDROME WITH CONSTIPATION: ICD-10-CM

## 2025-05-30 DIAGNOSIS — K21.00 GASTROESOPHAGEAL REFLUX DISEASE WITH ESOPHAGITIS WITHOUT HEMORRHAGE: Primary | ICD-10-CM

## 2025-05-30 NOTE — PROGRESS NOTES
PATIENT INFORMATION  Mariana Olmstead       - 1990    CHIEF COMPLAINT  Chief Complaint   Patient presents with    Follow-up       HISTORY OF PRESENT ILLNESS  Here today for GERD and IBS-C follow-up     GERD: TODAY: Pantoprazole and famotidine BID, this is working great, no HB or breakthrough symptoms.     IBS-C: Still doing well with 290 mcg linzess, bowels are daily and issues, no concerns with bloating or cramping.      Gastric sleeve 10 yrs  7 yrs ago cholecystectomy     2024 EGD and Colon normal duodenum, mild gastritis, grade A esophagitis, colon normal.        REVIEWED PERTINENT RESULTS/ LABS  Lab Results   Component Value Date    CASEREPORT  2024     Surgical Pathology Report                         Case: [id number]                                  Authorizing Provider:  Ryan Euceda        Collected:           2024 03:58 PM                                 MD Telly                                                                   Ordering Location:     [facility]   Received:            2024 04:47 PM                                 OR                                                                           Pathologist:           Bradford Asher MD                                                          Specimens:   1) - Gastric, Body                                                                                  2) - Esophagus, Distal                                                                     FINALDX  2024     1.  Stomach, biopsy: Antral type gastric mucosa with   A. Mild to moderate reactive/chemical gastropathy with patchy mild active inflammation and repair.   B. No metaplasia, dysplasia nor malignancy.   C. No H. pylori-like organisms identified by routine or immunohistochemical staining.    2.  Esophagus, distal, biopsy: Squamoglandular mucosa and submucosa with    A.  Mild predominantly chronic inflammation; no significant  eosinophilia.    B. No definitive well-developed goblet cell metaplasia identified by routine staining.   C. No dysplasia nor malignancy.                 D. No viral inclusions nor fungal organisms identified.       Lab Results   Component Value Date    HGB 13.4 04/01/2024    MCV 89.8 04/01/2024     04/01/2024    ALT 14 04/01/2024    AST 13 04/01/2024    HGBA1C 5.0 11/17/2023    FERRITIN 27.2 11/17/2023    IRON 95 11/17/2023    TIBC 303 11/17/2023      No results found.    REVIEW OF SYSTEMS  Review of Systems      ACTIVE PROBLEMS  Patient Active Problem List    Diagnosis     Generalized abdominal pain [R10.84]     Diarrhea [R19.7]          PAST MEDICAL HISTORY  Past Medical History:   Diagnosis Date    Asthma     GERD (gastroesophageal reflux disease)          SURGICAL HISTORY  Past Surgical History:   Procedure Laterality Date    BARIATRIC SURGERY  2012    Sleeve    CHOLECYSTECTOMY      COLONOSCOPY N/A 05/23/2024    Procedure: COLONOSCOPY;  Surgeon: Ryan Euceda MD;  Location: [facility] OR;  Service: Gastroenterology;  Laterality: N/A;    ENDOSCOPY N/A 05/23/2024    Procedure: ESOPHAGOGASTRODUODENOSCOPY WITH BIOPSY;  Surgeon: Ryan Euceda MD;  Location: [facility] OR;  Service: Gastroenterology;  Laterality: N/A;  Gastritis; Esophagitis; Gastric bx; Distal esophagus bx    GASTRIC SLEEVE LAPAROSCOPIC           FAMILY HISTORY  Family History   Problem Relation Age of Onset    Colon polyps Mother     Hyperlipidemia Father     Hypertension Father     Colon cancer Maternal Grandfather          SOCIAL HISTORY  Social History     Occupational History    Not on file   Tobacco Use    Smoking status: Never    Smokeless tobacco: Never   Vaping Use    Vaping status: Never Used   Substance and Sexual Activity    Alcohol use: No    Drug use: Never    Sexual activity: Yes     Partners: Male     Birth control/protection: I.U.D.         CURRENT MEDICATIONS    Current Outpatient Medications:      "albuterol (PROVENTIL HFA;VENTOLIN HFA) 108 (90 Base) MCG/ACT inhaler, Inhale 2 puffs Every 4 (Four) Hours As Needed for Wheezing., Disp: , Rfl:     buPROPion SR (WELLBUTRIN SR) 150 MG 12 hr tablet, Take 2 tablets by mouth Daily., Disp: , Rfl:     cetirizine (zyrTEC) 5 MG tablet, Take 1 tablet by mouth Daily., Disp: , Rfl:     famotidine (PEPCID) 40 MG tablet, Take 1 tablet by mouth Every Night., Disp: 90 tablet, Rfl: 3    fluticasone-salmeterol (ADVAIR) 100-50 MCG/DOSE DISKUS, Inhale 2 (Two) Times a Day., Disp: , Rfl:     Levonorgestrel (Mirena, 52 MG,) 20 MCG/DAY intrauterine device IUD, To be inserted one time by prescriber. Route intrauterine., Disp: , Rfl:     linaclotide (Linzess) 290 MCG capsule capsule, TAKE 1 CAPSULE BY MOUTH IN THE  MORNING BEFORE BREAKFAST, Disp: 90 capsule, Rfl: 0    Nurtec 75 MG dispersible tablet, DISSOLVE 1 TABLET BY MOUTH ONCE DAILY AS NEEDED FOR MIGRAINE HEADACHE, Disp: , Rfl:     pantoprazole (PROTONIX) 40 MG EC tablet, Take 1 tablet by mouth 2 (Two) Times a Day Before Meals., Disp: 60 tablet, Rfl: 3    ALLERGIES  Patient has no known allergies.    VITALS  Vitals:    05/30/25 1115   BP: 124/60   BP Location: Left arm   Patient Position: Sitting   Cuff Size: Adult   Weight: 112 kg (247 lb 6.4 oz)   Height: 170.2 cm (67.01\")       PHYSICAL EXAM  Debilities/Disabilities Identified: None  Emotional Behavior: Appropriate  Wt Readings from Last 3 Encounters:   05/30/25 112 kg (247 lb 6.4 oz)   02/28/25 108 kg (238 lb 3.2 oz)   08/30/24 106 kg (233 lb)     Ht Readings from Last 1 Encounters:   05/30/25 170.2 cm (67.01\")     Body mass index is 38.74 kg/m².  Physical Exam  Constitutional:       General: She is not in acute distress.     Appearance: Normal appearance. She is not ill-appearing.   HENT:      Head: Normocephalic and atraumatic.      Mouth/Throat:      Mouth: Mucous membranes are moist.      Pharynx: No posterior oropharyngeal erythema.   Eyes:      General: No scleral " icterus.  Cardiovascular:      Rate and Rhythm: Normal rate and regular rhythm.      Heart sounds: Normal heart sounds.   Pulmonary:      Effort: Pulmonary effort is normal.      Breath sounds: Normal breath sounds.   Abdominal:      General: Abdomen is flat. Bowel sounds are normal. There is no distension.      Palpations: Abdomen is soft. There is no mass.      Tenderness: There is abdominal tenderness in the right lower quadrant. There is no guarding or rebound. Negative signs include Scherer's sign.      Hernia: No hernia is present.   Musculoskeletal:      Cervical back: Neck supple.   Skin:     General: Skin is warm.      Capillary Refill: Capillary refill takes less than 2 seconds.   Neurological:      General: No focal deficit present.      Mental Status: She is alert and oriented to person, place, and time.   Psychiatric:         Mood and Affect: Mood normal.         Behavior: Behavior normal.         Thought Content: Thought content normal.         Judgment: Judgment normal.         CLINICAL DATA REVIEWED   reviewed previous lab results and integrated with today's visit, reviewed notes from other physicians and/or last GI encounter, reviewed previous endoscopy results and available photos, reviewed surgical pathology results from previous biopsies    ASSESSMENT  Diagnoses and all orders for this visit:    Gastroesophageal reflux disease with esophagitis without hemorrhage    Irritable bowel syndrome with constipation          PLAN    GERD: Continue BID PPI/H2RA  IBS-C: Continue linzess 290 mcg    Return in about 6 months (around 11/30/2025).    I have discussed the above plan with the patient.  They verbalize understanding and are in agreement with the plan.  They have been advised to contact the office for any questions, concerns, or changes related to their health.

## 2025-07-18 DIAGNOSIS — K58.1 IRRITABLE BOWEL SYNDROME WITH CONSTIPATION: ICD-10-CM

## 2025-07-18 RX ORDER — LINACLOTIDE 290 UG/1
290 CAPSULE, GELATIN COATED ORAL
Qty: 90 CAPSULE | Refills: 1 | Status: SHIPPED | OUTPATIENT
Start: 2025-07-18

## 2025-07-21 RX ORDER — PANTOPRAZOLE SODIUM 40 MG/1
40 TABLET, DELAYED RELEASE ORAL
Qty: 180 TABLET | Refills: 1 | Status: SHIPPED | OUTPATIENT
Start: 2025-07-21

## (undated) DEVICE — Device

## (undated) DEVICE — THE BITE BLOCK MAXI, LATEX FREE STRAP IS USED TO PROTECT THE ENDOSCOPE INSERTION TUBE FROM BEING BITTEN BY THE PATIENT.

## (undated) DEVICE — GLV SURG SENSICARE PI MIC PF SZ8 LF STRL

## (undated) DEVICE — SOL IRR H2O BTL 1000ML STRL

## (undated) DEVICE — JACKT LAB F/R KNIT CUFF/COLR XLG BLU

## (undated) DEVICE — BW-412T DISP COMBO CLEANING BRUSH: Brand: SINGLE USE COMBINATION CLEANING BRUSH

## (undated) DEVICE — VIAL FORMALIN CAP 10P 40ML

## (undated) DEVICE — KT ORCA ORCAPOD DISP STRL

## (undated) DEVICE — LINER SURG CANSTR SXN S/RIGD 1500CC

## (undated) DEVICE — ADAPT CLN BIOGUARD AIR/H2O DISP

## (undated) DEVICE — SYR LL W/SCALE/MARK 3ML STRL

## (undated) DEVICE — FRCP BX RADJAW4 NDL 2.8 240CM LG OG BX40